# Patient Record
Sex: FEMALE | Race: WHITE | NOT HISPANIC OR LATINO | Employment: OTHER | URBAN - METROPOLITAN AREA
[De-identification: names, ages, dates, MRNs, and addresses within clinical notes are randomized per-mention and may not be internally consistent; named-entity substitution may affect disease eponyms.]

---

## 2018-06-22 ENCOUNTER — RECORDS - HEALTHEAST (OUTPATIENT)
Dept: LAB | Facility: CLINIC | Age: 68
End: 2018-06-22

## 2018-06-22 LAB
ANION GAP SERPL CALCULATED.3IONS-SCNC: 9 MMOL/L (ref 5–18)
BUN SERPL-MCNC: 15 MG/DL (ref 8–22)
CALCIUM SERPL-MCNC: 9.4 MG/DL (ref 8.5–10.5)
CHLORIDE BLD-SCNC: 104 MMOL/L (ref 98–107)
CHOLEST SERPL-MCNC: 195 MG/DL
CO2 SERPL-SCNC: 26 MMOL/L (ref 22–31)
CREAT SERPL-MCNC: 0.71 MG/DL (ref 0.6–1.1)
FASTING STATUS PATIENT QL REPORTED: YES
GFR SERPL CREATININE-BSD FRML MDRD: >60 ML/MIN/1.73M2
GLUCOSE BLD-MCNC: 104 MG/DL (ref 70–125)
HDLC SERPL-MCNC: 57 MG/DL
LDLC SERPL CALC-MCNC: 118 MG/DL
POTASSIUM BLD-SCNC: 4.3 MMOL/L (ref 3.5–5)
SODIUM SERPL-SCNC: 139 MMOL/L (ref 136–145)
TRIGL SERPL-MCNC: 102 MG/DL
TSH SERPL DL<=0.005 MIU/L-ACNC: 2.27 UIU/ML (ref 0.3–5)

## 2018-11-27 ENCOUNTER — RECORDS - HEALTHEAST (OUTPATIENT)
Dept: LAB | Facility: CLINIC | Age: 68
End: 2018-11-27

## 2018-11-27 LAB — BNP SERPL-MCNC: 100 PG/ML (ref 0–109)

## 2019-05-30 ENCOUNTER — RECORDS - HEALTHEAST (OUTPATIENT)
Dept: LAB | Facility: CLINIC | Age: 69
End: 2019-05-30

## 2019-05-30 LAB — TSH SERPL DL<=0.005 MIU/L-ACNC: 1.28 UIU/ML (ref 0.3–5)

## 2020-08-05 ENCOUNTER — RECORDS - HEALTHEAST (OUTPATIENT)
Dept: LAB | Facility: CLINIC | Age: 70
End: 2020-08-05

## 2020-08-05 LAB
ALBUMIN SERPL-MCNC: 3.7 G/DL (ref 3.5–5)
ALP SERPL-CCNC: 116 U/L (ref 45–120)
ALT SERPL W P-5'-P-CCNC: 15 U/L (ref 0–45)
ANION GAP SERPL CALCULATED.3IONS-SCNC: 10 MMOL/L (ref 5–18)
AST SERPL W P-5'-P-CCNC: 16 U/L (ref 0–40)
BILIRUB SERPL-MCNC: 1.1 MG/DL (ref 0–1)
BUN SERPL-MCNC: 13 MG/DL (ref 8–22)
CALCIUM SERPL-MCNC: 9.6 MG/DL (ref 8.5–10.5)
CHLORIDE BLD-SCNC: 104 MMOL/L (ref 98–107)
CHOLEST SERPL-MCNC: 187 MG/DL
CO2 SERPL-SCNC: 26 MMOL/L (ref 22–31)
CREAT SERPL-MCNC: 0.72 MG/DL (ref 0.6–1.1)
FASTING STATUS PATIENT QL REPORTED: NORMAL
GFR SERPL CREATININE-BSD FRML MDRD: >60 ML/MIN/1.73M2
GLUCOSE BLD-MCNC: 104 MG/DL (ref 70–125)
HDLC SERPL-MCNC: 60 MG/DL
LDLC SERPL CALC-MCNC: 112 MG/DL
POTASSIUM BLD-SCNC: 4.1 MMOL/L (ref 3.5–5)
PROT SERPL-MCNC: 7.8 G/DL (ref 6–8)
SODIUM SERPL-SCNC: 140 MMOL/L (ref 136–145)
TRIGL SERPL-MCNC: 75 MG/DL
TSH SERPL DL<=0.005 MIU/L-ACNC: 0.67 UIU/ML (ref 0.3–5)

## 2021-05-24 ENCOUNTER — RECORDS - HEALTHEAST (OUTPATIENT)
Dept: ADMINISTRATIVE | Facility: CLINIC | Age: 71
End: 2021-05-24

## 2021-06-02 ENCOUNTER — RECORDS - HEALTHEAST (OUTPATIENT)
Dept: ADMINISTRATIVE | Facility: CLINIC | Age: 71
End: 2021-06-02

## 2021-08-10 ENCOUNTER — LAB REQUISITION (OUTPATIENT)
Dept: LAB | Facility: CLINIC | Age: 71
End: 2021-08-10

## 2021-08-10 DIAGNOSIS — E03.9 HYPOTHYROIDISM, UNSPECIFIED: ICD-10-CM

## 2021-08-10 DIAGNOSIS — Z13.220 ENCOUNTER FOR SCREENING FOR LIPOID DISORDERS: ICD-10-CM

## 2021-08-10 LAB
ALBUMIN SERPL-MCNC: 3.3 G/DL (ref 3.5–5)
ALP SERPL-CCNC: 106 U/L (ref 45–120)
ALT SERPL W P-5'-P-CCNC: 15 U/L (ref 0–45)
ANION GAP SERPL CALCULATED.3IONS-SCNC: 10 MMOL/L (ref 5–18)
AST SERPL W P-5'-P-CCNC: 15 U/L (ref 0–40)
BILIRUB SERPL-MCNC: 0.9 MG/DL (ref 0–1)
BUN SERPL-MCNC: 12 MG/DL (ref 8–22)
CALCIUM SERPL-MCNC: 9.3 MG/DL (ref 8.5–10.5)
CHLORIDE BLD-SCNC: 103 MMOL/L (ref 98–107)
CHOLEST SERPL-MCNC: 194 MG/DL
CO2 SERPL-SCNC: 27 MMOL/L (ref 22–31)
CREAT SERPL-MCNC: 0.71 MG/DL (ref 0.6–1.1)
FASTING STATUS PATIENT QL REPORTED: NORMAL
GFR SERPL CREATININE-BSD FRML MDRD: 87 ML/MIN/1.73M2
GLUCOSE BLD-MCNC: 111 MG/DL (ref 70–125)
HDLC SERPL-MCNC: 57 MG/DL
LDLC SERPL CALC-MCNC: 119 MG/DL
POTASSIUM BLD-SCNC: 4.4 MMOL/L (ref 3.5–5)
PROT SERPL-MCNC: 7.4 G/DL (ref 6–8)
SODIUM SERPL-SCNC: 140 MMOL/L (ref 136–145)
TRIGL SERPL-MCNC: 88 MG/DL
TSH SERPL DL<=0.005 MIU/L-ACNC: 1.12 UIU/ML (ref 0.3–5)

## 2021-08-10 PROCEDURE — 84155 ASSAY OF PROTEIN SERUM: CPT | Performed by: PHYSICIAN ASSISTANT

## 2021-08-10 PROCEDURE — 84443 ASSAY THYROID STIM HORMONE: CPT | Performed by: PHYSICIAN ASSISTANT

## 2021-08-10 PROCEDURE — 82565 ASSAY OF CREATININE: CPT | Performed by: PHYSICIAN ASSISTANT

## 2021-08-10 PROCEDURE — 80061 LIPID PANEL: CPT | Performed by: PHYSICIAN ASSISTANT

## 2022-06-13 PROCEDURE — 82310 ASSAY OF CALCIUM: CPT | Performed by: PHYSICIAN ASSISTANT

## 2022-06-13 PROCEDURE — 84443 ASSAY THYROID STIM HORMONE: CPT | Performed by: PHYSICIAN ASSISTANT

## 2022-06-14 ENCOUNTER — LAB REQUISITION (OUTPATIENT)
Dept: LAB | Facility: CLINIC | Age: 72
End: 2022-06-14

## 2022-06-14 DIAGNOSIS — E03.9 HYPOTHYROIDISM, UNSPECIFIED: ICD-10-CM

## 2022-06-14 DIAGNOSIS — J44.9 CHRONIC OBSTRUCTIVE PULMONARY DISEASE, UNSPECIFIED (H): ICD-10-CM

## 2022-06-14 LAB
ANION GAP SERPL CALCULATED.3IONS-SCNC: 12 MMOL/L (ref 5–18)
BUN SERPL-MCNC: 16 MG/DL (ref 8–28)
CALCIUM SERPL-MCNC: 9.5 MG/DL (ref 8.5–10.5)
CHLORIDE BLD-SCNC: 101 MMOL/L (ref 98–107)
CO2 SERPL-SCNC: 26 MMOL/L (ref 22–31)
CREAT SERPL-MCNC: 0.68 MG/DL (ref 0.6–1.1)
GFR SERPL CREATININE-BSD FRML MDRD: >90 ML/MIN/1.73M2
GLUCOSE BLD-MCNC: 99 MG/DL (ref 70–125)
POTASSIUM BLD-SCNC: 4.2 MMOL/L (ref 3.5–5)
SODIUM SERPL-SCNC: 139 MMOL/L (ref 136–145)
TSH SERPL DL<=0.005 MIU/L-ACNC: 1.09 UIU/ML (ref 0.3–5)

## 2022-06-16 ENCOUNTER — LAB REQUISITION (OUTPATIENT)
Dept: LAB | Facility: CLINIC | Age: 72
End: 2022-06-16
Payer: COMMERCIAL

## 2022-06-16 DIAGNOSIS — Z01.812 ENCOUNTER FOR PREPROCEDURAL LABORATORY EXAMINATION: ICD-10-CM

## 2022-06-16 PROCEDURE — U0005 INFEC AGEN DETEC AMPLI PROBE: HCPCS | Mod: ORL | Performed by: PHYSICIAN ASSISTANT

## 2022-06-17 LAB — SARS-COV-2 RNA RESP QL NAA+PROBE: NEGATIVE

## 2023-01-01 ENCOUNTER — PATIENT OUTREACH (OUTPATIENT)
Dept: SURGERY | Facility: CLINIC | Age: 73
End: 2023-01-01
Payer: COMMERCIAL

## 2023-01-01 ENCOUNTER — HOSPITAL ENCOUNTER (OUTPATIENT)
Dept: RADIOLOGY | Facility: CLINIC | Age: 73
Discharge: HOME | End: 2023-09-15
Attending: RADIOLOGY
Payer: COMMERCIAL

## 2023-01-01 ENCOUNTER — OFFICE VISIT (OUTPATIENT)
Dept: SURGERY | Facility: CLINIC | Age: 73
End: 2023-01-01
Payer: COMMERCIAL

## 2023-01-01 ENCOUNTER — OFFICE VISIT (OUTPATIENT)
Dept: PULMONOLOGY | Facility: CLINIC | Age: 73
End: 2023-01-01
Payer: COMMERCIAL

## 2023-01-01 ENCOUNTER — APPOINTMENT (OUTPATIENT)
Dept: GENERAL RADIOLOGY | Facility: CLINIC | Age: 73
DRG: 208 | End: 2023-01-01
Attending: INTERNAL MEDICINE
Payer: COMMERCIAL

## 2023-01-01 ENCOUNTER — NURSE TRIAGE (OUTPATIENT)
Dept: NURSING | Facility: CLINIC | Age: 73
End: 2023-01-01

## 2023-01-01 ENCOUNTER — APPOINTMENT (OUTPATIENT)
Dept: GENERAL RADIOLOGY | Facility: CLINIC | Age: 73
DRG: 164 | End: 2023-01-01
Attending: THORACIC SURGERY (CARDIOTHORACIC VASCULAR SURGERY)
Payer: COMMERCIAL

## 2023-01-01 ENCOUNTER — HEALTH MAINTENANCE LETTER (OUTPATIENT)
Age: 73
End: 2023-01-01

## 2023-01-01 ENCOUNTER — APPOINTMENT (OUTPATIENT)
Dept: GENERAL RADIOLOGY | Facility: CLINIC | Age: 73
DRG: 208 | End: 2023-01-01
Attending: STUDENT IN AN ORGANIZED HEALTH CARE EDUCATION/TRAINING PROGRAM
Payer: COMMERCIAL

## 2023-01-01 ENCOUNTER — DOCUMENTATION ONLY (OUTPATIENT)
Dept: CARE COORDINATION | Facility: CLINIC | Age: 73
End: 2023-01-01

## 2023-01-01 ENCOUNTER — LAB (OUTPATIENT)
Dept: LAB | Facility: CLINIC | Age: 73
End: 2023-01-01
Payer: COMMERCIAL

## 2023-01-01 ENCOUNTER — NURSE TRIAGE (OUTPATIENT)
Dept: ONCOLOGY | Facility: CLINIC | Age: 73
End: 2023-01-01

## 2023-01-01 ENCOUNTER — TRANSFERRED RECORDS (OUTPATIENT)
Dept: HEALTH INFORMATION MANAGEMENT | Facility: CLINIC | Age: 73
End: 2023-01-01

## 2023-01-01 ENCOUNTER — HOSPITAL ENCOUNTER (OUTPATIENT)
Dept: CT IMAGING | Facility: CLINIC | Age: 73
Discharge: HOME | End: 2023-09-15
Attending: THORACIC SURGERY (CARDIOTHORACIC VASCULAR SURGERY)
Payer: COMMERCIAL

## 2023-01-01 ENCOUNTER — APPOINTMENT (OUTPATIENT)
Dept: OCCUPATIONAL THERAPY | Facility: CLINIC | Age: 73
DRG: 164 | End: 2023-01-01
Attending: THORACIC SURGERY (CARDIOTHORACIC VASCULAR SURGERY)
Payer: COMMERCIAL

## 2023-01-01 ENCOUNTER — APPOINTMENT (OUTPATIENT)
Dept: GENERAL RADIOLOGY | Facility: CLINIC | Age: 73
DRG: 164 | End: 2023-01-01
Attending: STUDENT IN AN ORGANIZED HEALTH CARE EDUCATION/TRAINING PROGRAM
Payer: COMMERCIAL

## 2023-01-01 ENCOUNTER — TUMOR CONFERENCE (OUTPATIENT)
Dept: ONCOLOGY | Facility: CLINIC | Age: 73
End: 2023-01-01
Payer: COMMERCIAL

## 2023-01-01 ENCOUNTER — ONCOLOGY VISIT (OUTPATIENT)
Dept: SURGERY | Facility: CLINIC | Age: 73
End: 2023-01-01
Attending: THORACIC SURGERY (CARDIOTHORACIC VASCULAR SURGERY)
Payer: COMMERCIAL

## 2023-01-01 ENCOUNTER — TELEPHONE (OUTPATIENT)
Dept: ALLERGY | Facility: CLINIC | Age: 73
End: 2023-01-01

## 2023-01-01 ENCOUNTER — TRANSFERRED RECORDS (OUTPATIENT)
Dept: PULMONOLOGY | Facility: CLINIC | Age: 73
End: 2023-01-01
Payer: COMMERCIAL

## 2023-01-01 ENCOUNTER — TELEPHONE (OUTPATIENT)
Dept: PULMONOLOGY | Facility: CLINIC | Age: 73
End: 2023-01-01
Payer: COMMERCIAL

## 2023-01-01 ENCOUNTER — APPOINTMENT (OUTPATIENT)
Dept: RADIOLOGY | Facility: CLINIC | Age: 73
End: 2023-01-01
Attending: EMERGENCY MEDICINE
Payer: COMMERCIAL

## 2023-01-01 ENCOUNTER — DOCUMENTATION ONLY (OUTPATIENT)
Dept: OTHER | Facility: CLINIC | Age: 73
End: 2023-01-01
Payer: COMMERCIAL

## 2023-01-01 ENCOUNTER — HOSPITAL ENCOUNTER (INPATIENT)
Facility: CLINIC | Age: 73
LOS: 2 days | Discharge: HOME | DRG: 164 | End: 2023-10-26
Attending: THORACIC SURGERY (CARDIOTHORACIC VASCULAR SURGERY) | Admitting: THORACIC SURGERY (CARDIOTHORACIC VASCULAR SURGERY)
Payer: COMMERCIAL

## 2023-01-01 ENCOUNTER — NURSE TRIAGE (OUTPATIENT)
Dept: ONCOLOGY | Facility: CLINIC | Age: 73
End: 2023-01-01
Payer: COMMERCIAL

## 2023-01-01 ENCOUNTER — DOCUMENTATION ONLY (OUTPATIENT)
Dept: CARE COORDINATION | Facility: CLINIC | Age: 73
End: 2023-01-01
Payer: COMMERCIAL

## 2023-01-01 ENCOUNTER — HOSPITAL ENCOUNTER (OUTPATIENT)
Dept: CT IMAGING | Facility: CLINIC | Age: 73
Discharge: HOME | End: 2023-09-17
Attending: THORACIC SURGERY (CARDIOTHORACIC VASCULAR SURGERY) | Admitting: THORACIC SURGERY (CARDIOTHORACIC VASCULAR SURGERY)
Payer: COMMERCIAL

## 2023-01-01 ENCOUNTER — ANCILLARY PROCEDURE (OUTPATIENT)
Dept: GENERAL RADIOLOGY | Facility: CLINIC | Age: 73
End: 2023-01-01
Attending: THORACIC SURGERY (CARDIOTHORACIC VASCULAR SURGERY)
Payer: COMMERCIAL

## 2023-01-01 ENCOUNTER — TELEPHONE (OUTPATIENT)
Dept: SURGERY | Facility: CLINIC | Age: 73
End: 2023-01-01

## 2023-01-01 ENCOUNTER — APPOINTMENT (OUTPATIENT)
Dept: CT IMAGING | Facility: CLINIC | Age: 73
DRG: 208 | End: 2023-01-01
Attending: THORACIC SURGERY (CARDIOTHORACIC VASCULAR SURGERY)
Payer: COMMERCIAL

## 2023-01-01 ENCOUNTER — HOSPITAL ENCOUNTER (OUTPATIENT)
Facility: HOSPITAL | Age: 73
Discharge: HOME | End: 2023-09-28
Attending: INTERNAL MEDICINE | Admitting: INTERNAL MEDICINE
Payer: COMMERCIAL

## 2023-01-01 ENCOUNTER — TELEPHONE (OUTPATIENT)
Dept: INTERVENTIONAL RADIOLOGY/VASCULAR | Facility: CLINIC | Age: 73
End: 2023-01-01

## 2023-01-01 ENCOUNTER — ANESTHESIA EVENT (OUTPATIENT)
Dept: SURGERY | Facility: CLINIC | Age: 73
DRG: 164 | End: 2023-01-01
Payer: COMMERCIAL

## 2023-01-01 ENCOUNTER — PRE VISIT (OUTPATIENT)
Dept: SURGERY | Facility: CLINIC | Age: 73
End: 2023-01-01

## 2023-01-01 ENCOUNTER — OFFICE VISIT (OUTPATIENT)
Dept: PULMONOLOGY | Facility: CLINIC | Age: 73
End: 2023-01-01
Attending: THORACIC SURGERY (CARDIOTHORACIC VASCULAR SURGERY)
Payer: COMMERCIAL

## 2023-01-01 ENCOUNTER — LAB REQUISITION (OUTPATIENT)
Dept: LAB | Facility: CLINIC | Age: 73
End: 2023-01-01

## 2023-01-01 ENCOUNTER — HOSPITAL ENCOUNTER (EMERGENCY)
Facility: CLINIC | Age: 73
Discharge: HOME | End: 2023-09-27
Attending: EMERGENCY MEDICINE | Admitting: EMERGENCY MEDICINE
Payer: COMMERCIAL

## 2023-01-01 ENCOUNTER — ANESTHESIA (OUTPATIENT)
Dept: SURGERY | Facility: HOSPITAL | Age: 73
End: 2023-01-01
Payer: COMMERCIAL

## 2023-01-01 ENCOUNTER — PREP FOR PROCEDURE (OUTPATIENT)
Dept: SURGERY | Facility: CLINIC | Age: 73
End: 2023-01-01
Payer: COMMERCIAL

## 2023-01-01 ENCOUNTER — ANESTHESIA (OUTPATIENT)
Dept: SURGERY | Facility: CLINIC | Age: 73
DRG: 164 | End: 2023-01-01
Payer: COMMERCIAL

## 2023-01-01 ENCOUNTER — TELEPHONE (OUTPATIENT)
Dept: SURGERY | Facility: CLINIC | Age: 73
End: 2023-01-01
Payer: COMMERCIAL

## 2023-01-01 ENCOUNTER — HOSPITAL ENCOUNTER (EMERGENCY)
Facility: CLINIC | Age: 73
Discharge: HOME | End: 2023-11-04
Attending: EMERGENCY MEDICINE
Payer: COMMERCIAL

## 2023-01-01 ENCOUNTER — APPOINTMENT (OUTPATIENT)
Dept: CT IMAGING | Facility: CLINIC | Age: 73
End: 2023-01-01
Payer: COMMERCIAL

## 2023-01-01 ENCOUNTER — PATIENT OUTREACH (OUTPATIENT)
Dept: SURGERY | Facility: CLINIC | Age: 73
End: 2023-01-01

## 2023-01-01 ENCOUNTER — HOSPITAL ENCOUNTER (INPATIENT)
Facility: CLINIC | Age: 73
LOS: 1 days | DRG: 208 | End: 2023-11-19
Attending: THORACIC SURGERY (CARDIOTHORACIC VASCULAR SURGERY) | Admitting: THORACIC SURGERY (CARDIOTHORACIC VASCULAR SURGERY)
Payer: COMMERCIAL

## 2023-01-01 ENCOUNTER — HOSPITAL ENCOUNTER (EMERGENCY)
Facility: CLINIC | Age: 73
Discharge: HOME | End: 2023-10-01
Payer: COMMERCIAL

## 2023-01-01 ENCOUNTER — PREP FOR PROCEDURE (OUTPATIENT)
Dept: PULMONOLOGY | Facility: CLINIC | Age: 73
End: 2023-01-01
Payer: COMMERCIAL

## 2023-01-01 ENCOUNTER — ANESTHESIA EVENT (OUTPATIENT)
Dept: SURGERY | Facility: HOSPITAL | Age: 73
End: 2023-01-01
Payer: COMMERCIAL

## 2023-01-01 VITALS
SYSTOLIC BLOOD PRESSURE: 145 MMHG | DIASTOLIC BLOOD PRESSURE: 93 MMHG | BODY MASS INDEX: 40.71 KG/M2 | HEART RATE: 69 BPM | OXYGEN SATURATION: 96 % | WEIGHT: 222.6 LBS

## 2023-01-01 VITALS
WEIGHT: 216 LBS | RESPIRATION RATE: 18 BRPM | TEMPERATURE: 97.4 F | HEART RATE: 62 BPM | SYSTOLIC BLOOD PRESSURE: 173 MMHG | DIASTOLIC BLOOD PRESSURE: 82 MMHG | BODY MASS INDEX: 39.51 KG/M2 | OXYGEN SATURATION: 93 %

## 2023-01-01 VITALS
HEART RATE: 47 BPM | HEIGHT: 62 IN | OXYGEN SATURATION: 96 % | SYSTOLIC BLOOD PRESSURE: 148 MMHG | WEIGHT: 226 LBS | DIASTOLIC BLOOD PRESSURE: 69 MMHG | RESPIRATION RATE: 16 BRPM | TEMPERATURE: 97.3 F | BODY MASS INDEX: 41.59 KG/M2

## 2023-01-01 VITALS
SYSTOLIC BLOOD PRESSURE: 149 MMHG | OXYGEN SATURATION: 97 % | TEMPERATURE: 97.7 F | HEIGHT: 62 IN | RESPIRATION RATE: 16 BRPM | WEIGHT: 227 LBS | BODY MASS INDEX: 41.77 KG/M2 | HEART RATE: 51 BPM | DIASTOLIC BLOOD PRESSURE: 87 MMHG

## 2023-01-01 VITALS
HEIGHT: 62 IN | BODY MASS INDEX: 41.74 KG/M2 | SYSTOLIC BLOOD PRESSURE: 139 MMHG | RESPIRATION RATE: 18 BRPM | DIASTOLIC BLOOD PRESSURE: 66 MMHG | HEART RATE: 58 BPM | OXYGEN SATURATION: 96 %

## 2023-01-01 VITALS
BODY MASS INDEX: 41.34 KG/M2 | DIASTOLIC BLOOD PRESSURE: 84 MMHG | TEMPERATURE: 97.9 F | HEART RATE: 59 BPM | SYSTOLIC BLOOD PRESSURE: 191 MMHG | WEIGHT: 226 LBS | RESPIRATION RATE: 20 BRPM | OXYGEN SATURATION: 98 %

## 2023-01-01 VITALS
HEART RATE: 64 BPM | SYSTOLIC BLOOD PRESSURE: 161 MMHG | BODY MASS INDEX: 40.42 KG/M2 | DIASTOLIC BLOOD PRESSURE: 90 MMHG | WEIGHT: 228.2 LBS | OXYGEN SATURATION: 95 %

## 2023-01-01 VITALS
HEART RATE: 68 BPM | OXYGEN SATURATION: 94 % | BODY MASS INDEX: 40.98 KG/M2 | DIASTOLIC BLOOD PRESSURE: 68 MMHG | WEIGHT: 222.7 LBS | RESPIRATION RATE: 16 BRPM | SYSTOLIC BLOOD PRESSURE: 118 MMHG | TEMPERATURE: 97.9 F | HEIGHT: 62 IN

## 2023-01-01 VITALS
HEART RATE: 61 BPM | WEIGHT: 221 LBS | RESPIRATION RATE: 16 BRPM | BODY MASS INDEX: 40.67 KG/M2 | OXYGEN SATURATION: 96 % | DIASTOLIC BLOOD PRESSURE: 85 MMHG | TEMPERATURE: 97.8 F | HEIGHT: 62 IN | SYSTOLIC BLOOD PRESSURE: 183 MMHG

## 2023-01-01 VITALS
SYSTOLIC BLOOD PRESSURE: 157 MMHG | HEART RATE: 60 BPM | RESPIRATION RATE: 15 BRPM | DIASTOLIC BLOOD PRESSURE: 78 MMHG | BODY MASS INDEX: 41.34 KG/M2 | WEIGHT: 226 LBS | TEMPERATURE: 96.7 F | OXYGEN SATURATION: 96 %

## 2023-01-01 VITALS
TEMPERATURE: 100.1 F | DIASTOLIC BLOOD PRESSURE: 62 MMHG | OXYGEN SATURATION: 29 % | BODY MASS INDEX: 41.84 KG/M2 | SYSTOLIC BLOOD PRESSURE: 128 MMHG | WEIGHT: 228.84 LBS

## 2023-01-01 DIAGNOSIS — C34.31 MALIGNANT NEOPLASM OF LOWER LOBE OF RIGHT LUNG (H): Primary | ICD-10-CM

## 2023-01-01 DIAGNOSIS — E66.01 MORBID (SEVERE) OBESITY DUE TO EXCESS CALORIES (H): ICD-10-CM

## 2023-01-01 DIAGNOSIS — R91.8 RIGHT LOWER LOBE LUNG MASS: Primary | ICD-10-CM

## 2023-01-01 DIAGNOSIS — C34.31 MALIGNANT NEOPLASM OF LOWER LOBE OF RIGHT LUNG (H): ICD-10-CM

## 2023-01-01 DIAGNOSIS — Z01.818 PREOP EXAMINATION: Primary | ICD-10-CM

## 2023-01-01 DIAGNOSIS — R10.9 RIGHT FLANK PAIN: ICD-10-CM

## 2023-01-01 DIAGNOSIS — R91.8 RIGHT LOWER LOBE LUNG MASS: ICD-10-CM

## 2023-01-01 DIAGNOSIS — E03.9 HYPOTHYROIDISM, UNSPECIFIED: ICD-10-CM

## 2023-01-01 DIAGNOSIS — J94.2 HEMOPNEUMOTHORAX ON RIGHT: ICD-10-CM

## 2023-01-01 DIAGNOSIS — D02.20 NON-MUCINOUS ADENOCARCINOMA IN SITU OF LUNG: Primary | ICD-10-CM

## 2023-01-01 DIAGNOSIS — Z01.818 PREOP EXAMINATION: ICD-10-CM

## 2023-01-01 DIAGNOSIS — I10 HYPERTENSION: ICD-10-CM

## 2023-01-01 DIAGNOSIS — R91.1 PULMONARY NODULE: ICD-10-CM

## 2023-01-01 DIAGNOSIS — R91.8 PULMONARY NODULES: Primary | ICD-10-CM

## 2023-01-01 DIAGNOSIS — Z90.2 S/P LOBECTOMY OF LUNG: ICD-10-CM

## 2023-01-01 LAB
ABO/RH(D): NORMAL
ALBUMIN SERPL BCG-MCNC: 2.6 G/DL (ref 3.5–5.2)
ALBUMIN SERPL BCG-MCNC: 4 G/DL (ref 3.5–5.2)
ALBUMIN SERPL BCG-MCNC: 4.1 G/DL (ref 3.5–5.2)
ALBUMIN UR-MCNC: 30 MG/DL
ALBUMIN UR-MCNC: NEGATIVE MG/DL
ALP SERPL-CCNC: 102 U/L (ref 35–104)
ALP SERPL-CCNC: 111 U/L (ref 35–104)
ALP SERPL-CCNC: 99 U/L (ref 40–150)
ALT SERPL W P-5'-P-CCNC: 12 U/L (ref 10–35)
ALT SERPL W P-5'-P-CCNC: 24 U/L (ref 0–50)
ALT SERPL W P-5'-P-CCNC: 8 U/L (ref 0–50)
ANION GAP SERPL CALCULATED.3IONS-SCNC: 10 MMOL/L (ref 7–15)
ANION GAP SERPL CALCULATED.3IONS-SCNC: 12 MMOL/L (ref 7–15)
ANION GAP SERPL CALCULATED.3IONS-SCNC: 13 MMOL/L (ref 7–15)
ANION GAP SERPL CALCULATED.3IONS-SCNC: 13 MMOL/L (ref 7–15)
ANION GAP SERPL CALCULATED.3IONS-SCNC: 8 MMOL/L (ref 7–15)
ANION GAP SERPL CALCULATED.3IONS-SCNC: 9 MMOL/L (ref 7–15)
ANTIBODY SCREEN: NEGATIVE
APPEARANCE UR: ABNORMAL
APPEARANCE UR: CLEAR
APTT PPP: 25 SECONDS (ref 22–38)
AST SERPL W P-5'-P-CCNC: 15 U/L (ref 10–35)
AST SERPL W P-5'-P-CCNC: 17 U/L (ref 0–45)
AST SERPL W P-5'-P-CCNC: 71 U/L (ref 0–45)
BACTERIA #/AREA URNS HPF: ABNORMAL /HPF
BACTERIA UR CULT: NORMAL
BASE EXCESS BLDA CALC-SCNC: -0.9 MMOL/L (ref -9.6–2)
BASE EXCESS BLDA CALC-SCNC: -2.8 MMOL/L (ref -9.6–2)
BASE EXCESS BLDA CALC-SCNC: 2.3 MMOL/L (ref -9.6–2)
BASE EXCESS BLDV CALC-SCNC: 3.8 MMOL/L (ref -7.7–1.9)
BASOPHILS # BLD AUTO: 0 10E3/UL (ref 0–0.2)
BASOPHILS NFR BLD AUTO: 0 %
BILIRUB DIRECT SERPL-MCNC: 0.21 MG/DL (ref 0–0.3)
BILIRUB SERPL-MCNC: 0.7 MG/DL
BILIRUB SERPL-MCNC: 0.9 MG/DL
BILIRUB SERPL-MCNC: 0.9 MG/DL
BILIRUB UR QL STRIP: NEGATIVE
BILIRUB UR QL STRIP: NEGATIVE
BLD PROD TYP BPU: NORMAL
BLOOD COMPONENT TYPE: NORMAL
BUN SERPL-MCNC: 10.1 MG/DL (ref 8–23)
BUN SERPL-MCNC: 10.5 MG/DL (ref 8–23)
BUN SERPL-MCNC: 11.4 MG/DL (ref 8–23)
BUN SERPL-MCNC: 14.3 MG/DL (ref 8–23)
BUN SERPL-MCNC: 27.5 MG/DL (ref 8–23)
BUN SERPL-MCNC: 9.6 MG/DL (ref 8–23)
CA-I BLD-MCNC: 4.6 MG/DL (ref 4.4–5.2)
CA-I BLD-MCNC: 4.6 MG/DL (ref 4.4–5.2)
CA-I BLD-MCNC: 4.7 MG/DL (ref 4.4–5.2)
CA-I BLD-MCNC: 4.9 MG/DL (ref 4.4–5.2)
CALCIUM SERPL-MCNC: 8.7 MG/DL (ref 8.8–10.2)
CALCIUM SERPL-MCNC: 8.8 MG/DL (ref 8.8–10.2)
CALCIUM SERPL-MCNC: 9 MG/DL (ref 8.8–10.2)
CALCIUM SERPL-MCNC: 9.3 MG/DL (ref 8.8–10.2)
CALCIUM SERPL-MCNC: 9.4 MG/DL (ref 8.8–10.2)
CALCIUM SERPL-MCNC: 9.5 MG/DL (ref 8.8–10.2)
CHLORIDE SERPL-SCNC: 101 MMOL/L (ref 98–107)
CHLORIDE SERPL-SCNC: 101 MMOL/L (ref 98–107)
CHLORIDE SERPL-SCNC: 102 MMOL/L (ref 98–107)
CHLORIDE SERPL-SCNC: 104 MMOL/L (ref 98–107)
CHLORIDE SERPL-SCNC: 107 MMOL/L (ref 98–107)
CHLORIDE SERPL-SCNC: 109 MMOL/L (ref 98–107)
CHOLEST SERPL-MCNC: 197 MG/DL
CODING SYSTEM: NORMAL
COLOR UR AUTO: ABNORMAL
COLOR UR AUTO: YELLOW
CREAT BLD-MCNC: 0.6 MG/DL (ref 0.6–1.1)
CREAT SERPL-MCNC: 0.58 MG/DL (ref 0.51–0.95)
CREAT SERPL-MCNC: 0.6 MG/DL (ref 0.51–0.95)
CREAT SERPL-MCNC: 0.62 MG/DL (ref 0.51–0.95)
CREAT SERPL-MCNC: 0.64 MG/DL (ref 0.51–0.95)
CREAT SERPL-MCNC: 0.66 MG/DL (ref 0.51–0.95)
CREAT SERPL-MCNC: 0.66 MG/DL (ref 0.51–0.95)
CREAT SERPL-MCNC: 0.68 MG/DL (ref 0.51–0.95)
CROSSMATCH: NORMAL
DEPRECATED HCO3 PLAS-SCNC: 24 MMOL/L (ref 22–29)
DEPRECATED HCO3 PLAS-SCNC: 24 MMOL/L (ref 22–29)
DEPRECATED HCO3 PLAS-SCNC: 26 MMOL/L (ref 22–29)
DEPRECATED HCO3 PLAS-SCNC: 27 MMOL/L (ref 22–29)
DEPRECATED HCO3 PLAS-SCNC: 28 MMOL/L (ref 22–29)
DEPRECATED HCO3 PLAS-SCNC: 29 MMOL/L (ref 22–29)
DLCOCOR-%PRED-PRE: 109 %
DLCOCOR-PRE: 19.56 ML/MIN/MMHG
DLCOUNC-%PRED-PRE: 110 %
DLCOUNC-PRE: 19.8 ML/MIN/MMHG
DLCOUNC-PRED: 17.85 ML/MIN/MMHG
EGFRCR SERPLBLD CKD-EPI 2021: >60 ML/MIN/1.73M2
EGFRCR SERPLBLD CKD-EPI 2021: >90 ML/MIN/1.73M2
EOSINOPHIL # BLD AUTO: 0.2 10E3/UL (ref 0–0.7)
EOSINOPHIL NFR BLD AUTO: 3 %
ERV-%PRED-PRE: 21 %
ERV-PRE: 0.14 L
ERV-PRED: 0.64 L
ERYTHROCYTE [DISTWIDTH] IN BLOOD BY AUTOMATED COUNT: 13.5 % (ref 10–15)
ERYTHROCYTE [DISTWIDTH] IN BLOOD BY AUTOMATED COUNT: 13.6 % (ref 10–15)
ERYTHROCYTE [DISTWIDTH] IN BLOOD BY AUTOMATED COUNT: 13.8 % (ref 10–15)
ERYTHROCYTE [DISTWIDTH] IN BLOOD BY AUTOMATED COUNT: 13.8 % (ref 10–15)
ERYTHROCYTE [DISTWIDTH] IN BLOOD BY AUTOMATED COUNT: 14.6 % (ref 10–15)
EXPTIME-PRE: 7.97 SEC
FEF2575-%PRED-POST: 57 %
FEF2575-%PRED-PRE: 52 %
FEF2575-POST: 0.95 L/SEC
FEF2575-PRE: 0.87 L/SEC
FEF2575-PRED: 1.66 L/SEC
FEFMAX-%PRED-PRE: 88 %
FEFMAX-PRE: 4.61 L/SEC
FEFMAX-PRED: 5.23 L/SEC
FEV1-%PRED-PRE: 81 %
FEV1-PRE: 1.54 L
FEV1FEV6-PRE: 65 %
FEV1FEV6-PRED: 79 %
FEV1FVC-PRE: 65 %
FEV1FVC-PRED: 79 %
FEV1SVC-PRE: 62 %
FEV1SVC-PRED: 69 %
FIBRINOGEN PPP-MCNC: 716 MG/DL (ref 170–490)
FIFMAX-PRE: 3.99 L/SEC
FRCPLETH-%PRED-PRE: 118 %
FRCPLETH-PRE: 3 L
FRCPLETH-PRED: 2.54 L
FVC-%PRED-PRE: 98 %
FVC-PRE: 2.36 L
FVC-PRED: 2.39 L
GFR SERPL CREATININE-BSD FRML MDRD: >90 ML/MIN/1.73M2
GLUCOSE BLD-MCNC: 135 MG/DL (ref 70–99)
GLUCOSE BLD-MCNC: 153 MG/DL (ref 70–99)
GLUCOSE BLD-MCNC: 168 MG/DL (ref 70–99)
GLUCOSE BLDC GLUCOMTR-MCNC: 114 MG/DL (ref 70–99)
GLUCOSE BLDC GLUCOMTR-MCNC: 125 MG/DL (ref 70–99)
GLUCOSE BLDC GLUCOMTR-MCNC: 139 MG/DL (ref 70–99)
GLUCOSE BLDC GLUCOMTR-MCNC: 176 MG/DL (ref 70–99)
GLUCOSE SERPL-MCNC: 103 MG/DL (ref 70–99)
GLUCOSE SERPL-MCNC: 106 MG/DL (ref 70–99)
GLUCOSE SERPL-MCNC: 112 MG/DL (ref 70–99)
GLUCOSE SERPL-MCNC: 127 MG/DL (ref 70–99)
GLUCOSE SERPL-MCNC: 170 MG/DL (ref 70–99)
GLUCOSE SERPL-MCNC: 187 MG/DL (ref 70–99)
GLUCOSE UR STRIP-MCNC: NEGATIVE MG/DL
GLUCOSE UR STRIP-MCNC: NEGATIVE MG/DL
HCO3 BLDA-SCNC: 23 MMOL/L (ref 21–28)
HCO3 BLDA-SCNC: 25 MMOL/L (ref 21–28)
HCO3 BLDA-SCNC: 27 MMOL/L (ref 21–28)
HCO3 BLDV-SCNC: 29 MMOL/L (ref 21–28)
HCT VFR BLD AUTO: 37 % (ref 35–47)
HCT VFR BLD AUTO: 39.6 % (ref 35–47)
HCT VFR BLD AUTO: 40.3 % (ref 35–47)
HCT VFR BLD AUTO: 41.8 % (ref 35–47)
HCT VFR BLD AUTO: 44 % (ref 35–47)
HDLC SERPL-MCNC: 61 MG/DL
HGB BLD-MCNC: 11.6 G/DL (ref 11.7–15.7)
HGB BLD-MCNC: 13 G/DL (ref 11.7–15.7)
HGB BLD-MCNC: 13.1 G/DL (ref 11.7–15.7)
HGB BLD-MCNC: 13.8 G/DL
HGB BLD-MCNC: 14.2 G/DL (ref 11.7–15.7)
HGB BLD-MCNC: 14.3 G/DL (ref 11.7–15.7)
HGB BLD-MCNC: 14.4 G/DL (ref 11.7–15.7)
HGB BLD-MCNC: 14.6 G/DL (ref 11.7–15.7)
HGB BLD-MCNC: 14.8 G/DL (ref 11.7–15.7)
HGB BLD-MCNC: 15.6 G/DL (ref 11.7–15.7)
HGB UR QL STRIP: ABNORMAL
HGB UR QL STRIP: NEGATIVE
IC-%PRED-PRE: 111 %
IC-PRE: 2.27 L
IC-PRED: 2.04 L
IMM GRANULOCYTES # BLD: 0 10E3/UL
IMM GRANULOCYTES NFR BLD: 0 %
INR PPP: 0.98 (ref 0.85–1.15)
INR PPP: 1.18 (ref 0.85–1.15)
INTERPRETATION: NORMAL
ISSUE DATE AND TIME: NORMAL
ISSUE DATE AND TIME: NORMAL
KETONES UR STRIP-MCNC: NEGATIVE MG/DL
KETONES UR STRIP-MCNC: NEGATIVE MG/DL
LAB DIRECTOR COMMENTS: NORMAL
LAB DIRECTOR DISCLAIMER: NORMAL
LAB DIRECTOR INTERPRETATION: NORMAL
LAB DIRECTOR METHODOLOGY: NORMAL
LAB DIRECTOR RESULTS: NORMAL
LACTATE BLD-SCNC: 0.5 MMOL/L
LACTATE BLD-SCNC: 0.8 MMOL/L
LACTATE BLD-SCNC: 0.8 MMOL/L
LACTATE SERPL-SCNC: 1.7 MMOL/L (ref 0.7–2)
LDLC SERPL CALC-MCNC: 117 MG/DL
LEUKOCYTE ESTERASE UR QL STRIP: ABNORMAL
LEUKOCYTE ESTERASE UR QL STRIP: NEGATIVE
LIPASE SERPL-CCNC: 15 U/L (ref 13–60)
LYMPHOCYTES # BLD AUTO: 2.1 10E3/UL (ref 0.8–5.3)
LYMPHOCYTES NFR BLD AUTO: 29 %
MAGNESIUM SERPL-MCNC: 1.8 MG/DL (ref 1.7–2.3)
MAGNESIUM SERPL-MCNC: 1.9 MG/DL (ref 1.7–2.3)
MAGNESIUM SERPL-MCNC: 1.9 MG/DL (ref 1.7–2.3)
MCH RBC QN AUTO: 29.7 PG (ref 26.5–33)
MCH RBC QN AUTO: 30.2 PG (ref 26.5–33)
MCH RBC QN AUTO: 30.3 PG (ref 26.5–33)
MCH RBC QN AUTO: 30.4 PG (ref 26.5–33)
MCH RBC QN AUTO: 31 PG (ref 26.5–33)
MCHC RBC AUTO-ENTMCNC: 31.4 G/DL (ref 31.5–36.5)
MCHC RBC AUTO-ENTMCNC: 32.5 G/DL (ref 31.5–36.5)
MCHC RBC AUTO-ENTMCNC: 32.7 G/DL (ref 31.5–36.5)
MCHC RBC AUTO-ENTMCNC: 32.8 G/DL (ref 31.5–36.5)
MCHC RBC AUTO-ENTMCNC: 34.2 G/DL (ref 31.5–36.5)
MCV RBC AUTO: 89 FL (ref 78–100)
MCV RBC AUTO: 91 FL (ref 78–100)
MCV RBC AUTO: 92 FL (ref 78–100)
MCV RBC AUTO: 96 FL (ref 78–100)
MCV RBC AUTO: 96 FL (ref 78–100)
MONOCYTES # BLD AUTO: 0.6 10E3/UL (ref 0–1.3)
MONOCYTES NFR BLD AUTO: 9 %
MUCOUS THREADS #/AREA URNS LPF: PRESENT /LPF
MUCOUS THREADS #/AREA URNS LPF: PRESENT /LPF
NEUTROPHILS # BLD AUTO: 4.2 10E3/UL (ref 1.6–8.3)
NEUTROPHILS NFR BLD AUTO: 59 %
NITRATE UR QL: NEGATIVE
NITRATE UR QL: NEGATIVE
NONHDLC SERPL-MCNC: 136 MG/DL
NRBC # BLD AUTO: 0 10E3/UL
NRBC BLD AUTO-RTO: 0 /100
O2/TOTAL GAS SETTING VFR VENT: 100 %
O2/TOTAL GAS SETTING VFR VENT: 40 %
OXYHGB MFR BLDV: 88 % (ref 70–75)
PATH REPORT.COMMENTS IMP SPEC: ABNORMAL
PATH REPORT.COMMENTS IMP SPEC: NORMAL
PATH REPORT.COMMENTS IMP SPEC: YES
PATH REPORT.FINAL DX SPEC: ABNORMAL
PATH REPORT.FINAL DX SPEC: NORMAL
PATH REPORT.FINAL DX SPEC: NORMAL
PATH REPORT.GROSS SPEC: ABNORMAL
PATH REPORT.GROSS SPEC: NORMAL
PATH REPORT.GROSS SPEC: NORMAL
PATH REPORT.INTRAOP OBS SPEC DOC: NORMAL
PATH REPORT.MICROSCOPIC SPEC OTHER STN: ABNORMAL
PATH REPORT.MICROSCOPIC SPEC OTHER STN: NORMAL
PATH REPORT.MICROSCOPIC SPEC OTHER STN: NORMAL
PATH REPORT.RELEVANT HX SPEC: ABNORMAL
PATH REPORT.RELEVANT HX SPEC: NORMAL
PCO2 BLDA: 40 MM HG (ref 35–45)
PCO2 BLDA: 43 MM HG (ref 35–45)
PCO2 BLDA: 44 MM HG (ref 35–45)
PCO2 BLDV: 44 MM HG (ref 40–50)
PH BLDA: 7.34 [PH] (ref 7.35–7.45)
PH BLDA: 7.36 [PH] (ref 7.35–7.45)
PH BLDA: 7.44 [PH] (ref 7.35–7.45)
PH BLDV: 7.43 [PH] (ref 7.32–7.43)
PH UR STRIP: 5.5 [PH] (ref 5–7)
PH UR STRIP: 6.5 [PH] (ref 5–7)
PHOSPHATE SERPL-MCNC: 2.1 MG/DL (ref 2.5–4.5)
PHOSPHATE SERPL-MCNC: 2.4 MG/DL (ref 2.5–4.5)
PHOSPHATE SERPL-MCNC: 3 MG/DL (ref 2.5–4.5)
PHOTO IMAGE: ABNORMAL
PHOTO IMAGE: NORMAL
PLATELET # BLD AUTO: 192 10E3/UL (ref 150–450)
PLATELET # BLD AUTO: 198 10E3/UL (ref 150–450)
PLATELET # BLD AUTO: 206 10E3/UL (ref 150–450)
PLATELET # BLD AUTO: 210 10E3/UL (ref 150–450)
PLATELET # BLD AUTO: 221 10E3/UL (ref 150–450)
PLATELET # BLD AUTO: 250 10E3/UL (ref 150–450)
PO2 BLDA: 108 MM HG (ref 80–105)
PO2 BLDA: 195 MM HG (ref 80–105)
PO2 BLDA: 453 MM HG (ref 80–105)
PO2 BLDV: 57 MM HG (ref 25–47)
POTASSIUM BLD-SCNC: 3.7 MMOL/L (ref 3.5–5)
POTASSIUM BLD-SCNC: 3.8 MMOL/L (ref 3.5–5)
POTASSIUM BLD-SCNC: 3.9 MMOL/L (ref 3.5–5)
POTASSIUM SERPL-SCNC: 3.4 MMOL/L (ref 3.4–5.3)
POTASSIUM SERPL-SCNC: 3.8 MMOL/L (ref 3.4–5.3)
POTASSIUM SERPL-SCNC: 3.9 MMOL/L (ref 3.4–5.3)
POTASSIUM SERPL-SCNC: 3.9 MMOL/L (ref 3.4–5.3)
POTASSIUM SERPL-SCNC: 4 MMOL/L (ref 3.4–5.3)
POTASSIUM SERPL-SCNC: 4.2 MMOL/L (ref 3.4–5.3)
POTASSIUM SERPL-SCNC: 4.3 MMOL/L (ref 3.4–5.3)
PROCALCITONIN SERPL IA-MCNC: 1.16 NG/ML
PROT SERPL-MCNC: 6.3 G/DL (ref 6.4–8.3)
PROT SERPL-MCNC: 7.5 G/DL (ref 6.4–8.3)
PROT SERPL-MCNC: 7.8 G/DL (ref 6.4–8.3)
RBC # BLD AUTO: 3.84 10E6/UL (ref 3.8–5.2)
RBC # BLD AUTO: 4.22 10E6/UL (ref 3.8–5.2)
RBC # BLD AUTO: 4.29 10E6/UL (ref 3.8–5.2)
RBC # BLD AUTO: 4.71 10E6/UL (ref 3.8–5.2)
RBC # BLD AUTO: 4.85 10E6/UL (ref 3.8–5.2)
RBC URINE: 53 /HPF
RBC URINE: <1 /HPF
RVPLETH-%PRED-PRE: 153 %
RVPLETH-PRE: 2.8 L
RVPLETH-PRED: 1.82 L
SIGNIFICANT RESULTS: NORMAL
SODIUM BLD-SCNC: 140 MMOL/L (ref 135–145)
SODIUM BLD-SCNC: 142 MMOL/L (ref 135–145)
SODIUM BLD-SCNC: 143 MMOL/L (ref 135–145)
SODIUM SERPL-SCNC: 137 MMOL/L (ref 135–145)
SODIUM SERPL-SCNC: 138 MMOL/L (ref 135–145)
SODIUM SERPL-SCNC: 139 MMOL/L (ref 135–145)
SODIUM SERPL-SCNC: 142 MMOL/L (ref 135–145)
SODIUM SERPL-SCNC: 145 MMOL/L (ref 136–145)
SODIUM SERPL-SCNC: 146 MMOL/L (ref 135–145)
SP GR UR STRIP: 1.01 (ref 1–1.03)
SP GR UR STRIP: 1.01 (ref 1–1.03)
SPECIMEN DESCRIPTION: NORMAL
SPECIMEN DESCRIPTION: NORMAL
SPECIMEN EXPIRATION DATE: NORMAL
SQUAMOUS EPITHELIAL: 3 /HPF
SQUAMOUS EPITHELIAL: <1 /HPF
TEST DETAILS, MDL: NORMAL
TLCPLETH-%PRED-PRE: 114 %
TLCPLETH-PRE: 5.27 L
TLCPLETH-PRED: 4.59 L
TRANSITIONAL EPI: <1 /HPF
TRIGL SERPL-MCNC: 94 MG/DL
TROPONIN T SERPL HS-MCNC: 42 NG/L
TSH SERPL DL<=0.005 MIU/L-ACNC: 0.89 UIU/ML (ref 0.3–4.2)
UNIT ABO/RH: NORMAL
UNIT NUMBER: NORMAL
UNIT STATUS: NORMAL
UNIT TYPE ISBT: 5100
UROBILINOGEN UR STRIP-MCNC: 4 MG/DL
UROBILINOGEN UR STRIP-MCNC: <2 MG/DL
VA-%PRED-PRE: 110 %
VA-PRE: 4.65 L
VC-%PRED-PRE: 89 %
VC-PRE: 2.47 L
VC-PRED: 2.75 L
WBC # BLD AUTO: 10.6 10E3/UL (ref 4–11)
WBC # BLD AUTO: 13.2 10E3/UL (ref 4–11)
WBC # BLD AUTO: 13.8 10E3/UL (ref 4–11)
WBC # BLD AUTO: 15.2 10E3/UL (ref 4–11)
WBC # BLD AUTO: 7.2 10E3/UL (ref 4–11)
WBC URINE: 3 /HPF
WBC URINE: 4 /HPF

## 2023-01-01 PROCEDURE — 88305 TISSUE EXAM BY PATHOLOGIST: CPT | Mod: 26 | Performed by: PATHOLOGY

## 2023-01-01 PROCEDURE — 88341 IMHCHEM/IMCYTCHM EA ADD ANTB: CPT | Mod: TC | Performed by: THORACIC SURGERY (CARDIOTHORACIC VASCULAR SURGERY)

## 2023-01-01 PROCEDURE — 36415 COLL VENOUS BLD VENIPUNCTURE: CPT

## 2023-01-01 PROCEDURE — 71045 X-RAY EXAM CHEST 1 VIEW: CPT | Mod: 26 | Performed by: RADIOLOGY

## 2023-01-01 PROCEDURE — 250N000013 HC RX MED GY IP 250 OP 250 PS 637

## 2023-01-01 PROCEDURE — 250N000011 HC RX IP 250 OP 636: Performed by: RADIOLOGY

## 2023-01-01 PROCEDURE — 36415 COLL VENOUS BLD VENIPUNCTURE: CPT | Performed by: RADIOLOGY

## 2023-01-01 PROCEDURE — 82330 ASSAY OF CALCIUM: CPT

## 2023-01-01 PROCEDURE — 250N000009 HC RX 250: Performed by: NURSE ANESTHETIST, CERTIFIED REGISTERED

## 2023-01-01 PROCEDURE — 250N000011 HC RX IP 250 OP 636: Performed by: ANESTHESIOLOGY

## 2023-01-01 PROCEDURE — 258N000003 HC RX IP 258 OP 636: Performed by: THORACIC SURGERY (CARDIOTHORACIC VASCULAR SURGERY)

## 2023-01-01 PROCEDURE — 36415 COLL VENOUS BLD VENIPUNCTURE: CPT | Performed by: STUDENT IN AN ORGANIZED HEALTH CARE EDUCATION/TRAINING PROGRAM

## 2023-01-01 PROCEDURE — 85018 HEMOGLOBIN: CPT | Performed by: RADIOLOGY

## 2023-01-01 PROCEDURE — 88342 IMHCHEM/IMCYTCHM 1ST ANTB: CPT | Mod: TC | Performed by: THORACIC SURGERY (CARDIOTHORACIC VASCULAR SURGERY)

## 2023-01-01 PROCEDURE — 85027 COMPLETE CBC AUTOMATED: CPT | Performed by: STUDENT IN AN ORGANIZED HEALTH CARE EDUCATION/TRAINING PROGRAM

## 2023-01-01 PROCEDURE — 85384 FIBRINOGEN ACTIVITY: CPT

## 2023-01-01 PROCEDURE — 99292 CRITICAL CARE ADDL 30 MIN: CPT | Performed by: INTERNAL MEDICINE

## 2023-01-01 PROCEDURE — 999N000065 XR ABDOMEN PORT 1 VIEW

## 2023-01-01 PROCEDURE — 87040 BLOOD CULTURE FOR BACTERIA: CPT | Performed by: THORACIC SURGERY (CARDIOTHORACIC VASCULAR SURGERY)

## 2023-01-01 PROCEDURE — 80053 COMPREHEN METABOLIC PANEL: CPT

## 2023-01-01 PROCEDURE — 88331 PATH CONSLTJ SURG 1 BLK 1SPC: CPT | Mod: TC | Performed by: THORACIC SURGERY (CARDIOTHORACIC VASCULAR SURGERY)

## 2023-01-01 PROCEDURE — 85025 COMPLETE CBC W/AUTO DIFF WBC: CPT

## 2023-01-01 PROCEDURE — 82805 BLOOD GASES W/O2 SATURATION: CPT

## 2023-01-01 PROCEDURE — 99284 EMERGENCY DEPT VISIT MOD MDM: CPT | Mod: 25

## 2023-01-01 PROCEDURE — 999N000141 HC STATISTIC PRE-PROCEDURE NURSING ASSESSMENT: Performed by: INTERNAL MEDICINE

## 2023-01-01 PROCEDURE — 99204 OFFICE O/P NEW MOD 45 MIN: CPT | Performed by: THORACIC SURGERY (CARDIOTHORACIC VASCULAR SURGERY)

## 2023-01-01 PROCEDURE — 710N000009 HC RECOVERY PHASE 1, LEVEL 1, PER MIN: Performed by: THORACIC SURGERY (CARDIOTHORACIC VASCULAR SURGERY)

## 2023-01-01 PROCEDURE — 71045 X-RAY EXAM CHEST 1 VIEW: CPT

## 2023-01-01 PROCEDURE — 74177 CT ABD & PELVIS W/CONTRAST: CPT

## 2023-01-01 PROCEDURE — 88173 CYTOPATH EVAL FNA REPORT: CPT | Mod: 26 | Performed by: PATHOLOGY

## 2023-01-01 PROCEDURE — 258N000003 HC RX IP 258 OP 636: Performed by: ANESTHESIOLOGY

## 2023-01-01 PROCEDURE — 80053 COMPREHEN METABOLIC PANEL: CPT | Performed by: FAMILY MEDICINE

## 2023-01-01 PROCEDURE — 70470 CT HEAD/BRAIN W/O & W/DYE: CPT

## 2023-01-01 PROCEDURE — 250N000025 HC SEVOFLURANE, PER MIN: Performed by: THORACIC SURGERY (CARDIOTHORACIC VASCULAR SURGERY)

## 2023-01-01 PROCEDURE — 82565 ASSAY OF CREATININE: CPT

## 2023-01-01 PROCEDURE — 88331 PATH CONSLTJ SURG 1 BLK 1SPC: CPT | Mod: 26 | Performed by: PATHOLOGY

## 2023-01-01 PROCEDURE — 96374 THER/PROPH/DIAG INJ IV PUSH: CPT | Mod: 59

## 2023-01-01 PROCEDURE — 84100 ASSAY OF PHOSPHORUS: CPT | Performed by: STUDENT IN AN ORGANIZED HEALTH CARE EDUCATION/TRAINING PROGRAM

## 2023-01-01 PROCEDURE — 250N000011 HC RX IP 250 OP 636: Performed by: EMERGENCY MEDICINE

## 2023-01-01 PROCEDURE — 999N000065 XR CHEST PORT 1 VIEW

## 2023-01-01 PROCEDURE — 250N000011 HC RX IP 250 OP 636: Mod: JZ | Performed by: NURSE ANESTHETIST, CERTIFIED REGISTERED

## 2023-01-01 PROCEDURE — 86901 BLOOD TYPING SEROLOGIC RH(D): CPT | Performed by: CLINICAL NURSE SPECIALIST

## 2023-01-01 PROCEDURE — 70450 CT HEAD/BRAIN W/O DYE: CPT | Mod: 26 | Performed by: RADIOLOGY

## 2023-01-01 PROCEDURE — 82248 BILIRUBIN DIRECT: CPT

## 2023-01-01 PROCEDURE — 250N000009 HC RX 250

## 2023-01-01 PROCEDURE — 93010 ELECTROCARDIOGRAM REPORT: CPT | Performed by: INTERNAL MEDICINE

## 2023-01-01 PROCEDURE — 71046 X-RAY EXAM CHEST 2 VIEWS: CPT | Mod: GC | Performed by: RADIOLOGY

## 2023-01-01 PROCEDURE — 0BU647Z SUPPLEMENT RIGHT LOWER LOBE BRONCHUS WITH AUTOLOGOUS TISSUE SUBSTITUTE, PERCUTANEOUS ENDOSCOPIC APPROACH: ICD-10-PCS | Performed by: THORACIC SURGERY (CARDIOTHORACIC VASCULAR SURGERY)

## 2023-01-01 PROCEDURE — 5A1945Z RESPIRATORY VENTILATION, 24-96 CONSECUTIVE HOURS: ICD-10-PCS | Performed by: THORACIC SURGERY (CARDIOTHORACIC VASCULAR SURGERY)

## 2023-01-01 PROCEDURE — 94726 PLETHYSMOGRAPHY LUNG VOLUMES: CPT | Performed by: INTERNAL MEDICINE

## 2023-01-01 PROCEDURE — 83735 ASSAY OF MAGNESIUM: CPT | Performed by: STUDENT IN AN ORGANIZED HEALTH CARE EDUCATION/TRAINING PROGRAM

## 2023-01-01 PROCEDURE — 258N000003 HC RX IP 258 OP 636: Performed by: NURSE ANESTHETIST, CERTIFIED REGISTERED

## 2023-01-01 PROCEDURE — 31624 DX BRONCHOSCOPE/LAVAGE: CPT

## 2023-01-01 PROCEDURE — 83605 ASSAY OF LACTIC ACID: CPT

## 2023-01-01 PROCEDURE — 74018 RADEX ABDOMEN 1 VIEW: CPT | Mod: 26 | Performed by: RADIOLOGY

## 2023-01-01 PROCEDURE — 74018 RADEX ABDOMEN 1 VIEW: CPT | Mod: 26 | Performed by: STUDENT IN AN ORGANIZED HEALTH CARE EDUCATION/TRAINING PROGRAM

## 2023-01-01 PROCEDURE — 94002 VENT MGMT INPAT INIT DAY: CPT

## 2023-01-01 PROCEDURE — 250N000011 HC RX IP 250 OP 636

## 2023-01-01 PROCEDURE — 250N000013 HC RX MED GY IP 250 OP 250 PS 637: Performed by: STUDENT IN AN ORGANIZED HEALTH CARE EDUCATION/TRAINING PROGRAM

## 2023-01-01 PROCEDURE — 93005 ELECTROCARDIOGRAM TRACING: CPT

## 2023-01-01 PROCEDURE — 120N000002 HC R&B MED SURG/OB UMMC

## 2023-01-01 PROCEDURE — 99292 CRITICAL CARE ADDL 30 MIN: CPT | Performed by: ANESTHESIOLOGY

## 2023-01-01 PROCEDURE — 0B938ZZ DRAINAGE OF RIGHT MAIN BRONCHUS, VIA NATURAL OR ARTIFICIAL OPENING ENDOSCOPIC: ICD-10-PCS | Performed by: THORACIC SURGERY (CARDIOTHORACIC VASCULAR SURGERY)

## 2023-01-01 PROCEDURE — 250N000011 HC RX IP 250 OP 636: Mod: JZ | Performed by: STUDENT IN AN ORGANIZED HEALTH CARE EDUCATION/TRAINING PROGRAM

## 2023-01-01 PROCEDURE — 32674 THORACOSCOPY LYMPH NODE EXC: CPT | Mod: GC | Performed by: THORACIC SURGERY (CARDIOTHORACIC VASCULAR SURGERY)

## 2023-01-01 PROCEDURE — 250N000012 HC RX MED GY IP 250 OP 636 PS 637: Performed by: ANESTHESIOLOGY

## 2023-01-01 PROCEDURE — 250N000009 HC RX 250: Performed by: THORACIC SURGERY (CARDIOTHORACIC VASCULAR SURGERY)

## 2023-01-01 PROCEDURE — 87205 SMEAR GRAM STAIN: CPT | Performed by: SURGERY

## 2023-01-01 PROCEDURE — C9113 INJ PANTOPRAZOLE SODIUM, VIA: HCPCS | Mod: JZ | Performed by: STUDENT IN AN ORGANIZED HEALTH CARE EDUCATION/TRAINING PROGRAM

## 2023-01-01 PROCEDURE — 88341 IMHCHEM/IMCYTCHM EA ADD ANTB: CPT | Mod: 26 | Performed by: PATHOLOGY

## 2023-01-01 PROCEDURE — 250N000009 HC RX 250: Performed by: ANESTHESIOLOGY

## 2023-01-01 PROCEDURE — 85049 AUTOMATED PLATELET COUNT: CPT | Performed by: RADIOLOGY

## 2023-01-01 PROCEDURE — 999N000248 HC STATISTIC IV INSERT WITH US BY RN

## 2023-01-01 PROCEDURE — 70450 CT HEAD/BRAIN W/O DYE: CPT

## 2023-01-01 PROCEDURE — 272N000486 CT LUNG MEDIASTINUM BIOPSY

## 2023-01-01 PROCEDURE — 250N000011 HC RX IP 250 OP 636: Mod: JZ

## 2023-01-01 PROCEDURE — 85027 COMPLETE CBC AUTOMATED: CPT

## 2023-01-01 PROCEDURE — 71045 X-RAY EXAM CHEST 1 VIEW: CPT | Mod: 26 | Performed by: STUDENT IN AN ORGANIZED HEALTH CARE EDUCATION/TRAINING PROGRAM

## 2023-01-01 PROCEDURE — 71250 CT THORAX DX C-: CPT | Mod: 26 | Performed by: STUDENT IN AN ORGANIZED HEALTH CARE EDUCATION/TRAINING PROGRAM

## 2023-01-01 PROCEDURE — 250N000009 HC RX 250: Performed by: STUDENT IN AN ORGANIZED HEALTH CARE EDUCATION/TRAINING PROGRAM

## 2023-01-01 PROCEDURE — 97110 THERAPEUTIC EXERCISES: CPT | Mod: GO

## 2023-01-01 PROCEDURE — 0B978ZZ DRAINAGE OF LEFT MAIN BRONCHUS, VIA NATURAL OR ARTIFICIAL OPENING ENDOSCOPIC: ICD-10-PCS | Performed by: THORACIC SURGERY (CARDIOTHORACIC VASCULAR SURGERY)

## 2023-01-01 PROCEDURE — 370N000017 HC ANESTHESIA TECHNICAL FEE, PER MIN: Performed by: THORACIC SURGERY (CARDIOTHORACIC VASCULAR SURGERY)

## 2023-01-01 PROCEDURE — 258N000003 HC RX IP 258 OP 636: Performed by: STUDENT IN AN ORGANIZED HEALTH CARE EDUCATION/TRAINING PROGRAM

## 2023-01-01 PROCEDURE — 999N000157 HC STATISTIC RCP TIME EA 10 MIN

## 2023-01-01 PROCEDURE — 99285 EMERGENCY DEPT VISIT HI MDM: CPT | Mod: 25

## 2023-01-01 PROCEDURE — 85610 PROTHROMBIN TIME: CPT

## 2023-01-01 PROCEDURE — 710N000012 HC RECOVERY PHASE 2, PER MINUTE: Performed by: INTERNAL MEDICINE

## 2023-01-01 PROCEDURE — 80048 BASIC METABOLIC PNL TOTAL CA: CPT | Performed by: STUDENT IN AN ORGANIZED HEALTH CARE EDUCATION/TRAINING PROGRAM

## 2023-01-01 PROCEDURE — 85610 PROTHROMBIN TIME: CPT | Performed by: RADIOLOGY

## 2023-01-01 PROCEDURE — 96375 TX/PRO/DX INJ NEW DRUG ADDON: CPT

## 2023-01-01 PROCEDURE — 250N000011 HC RX IP 250 OP 636: Performed by: STUDENT IN AN ORGANIZED HEALTH CARE EDUCATION/TRAINING PROGRAM

## 2023-01-01 PROCEDURE — 97165 OT EVAL LOW COMPLEX 30 MIN: CPT | Mod: GO

## 2023-01-01 PROCEDURE — 99215 OFFICE O/P EST HI 40 MIN: CPT | Performed by: CLINICAL NURSE SPECIALIST

## 2023-01-01 PROCEDURE — 81455 SO/HL 51/>GSAP DNA/DNA&RNA: CPT | Performed by: THORACIC SURGERY (CARDIOTHORACIC VASCULAR SURGERY)

## 2023-01-01 PROCEDURE — 94729 DIFFUSING CAPACITY: CPT | Performed by: INTERNAL MEDICINE

## 2023-01-01 PROCEDURE — 88342 IMHCHEM/IMCYTCHM 1ST ANTB: CPT | Mod: 26 | Performed by: PATHOLOGY

## 2023-01-01 PROCEDURE — 71046 X-RAY EXAM CHEST 2 VIEWS: CPT

## 2023-01-01 PROCEDURE — 250N000011 HC RX IP 250 OP 636: Mod: JZ | Performed by: ANESTHESIOLOGY

## 2023-01-01 PROCEDURE — 80061 LIPID PANEL: CPT | Performed by: FAMILY MEDICINE

## 2023-01-01 PROCEDURE — 84443 ASSAY THYROID STIM HORMONE: CPT | Performed by: FAMILY MEDICINE

## 2023-01-01 PROCEDURE — 71045 X-RAY EXAM CHEST 1 VIEW: CPT | Mod: 76

## 2023-01-01 PROCEDURE — 250N000011 HC RX IP 250 OP 636: Mod: JZ | Performed by: THORACIC SURGERY (CARDIOTHORACIC VASCULAR SURGERY)

## 2023-01-01 PROCEDURE — 88309 TISSUE EXAM BY PATHOLOGIST: CPT | Mod: 26 | Performed by: PATHOLOGY

## 2023-01-01 PROCEDURE — 250N000013 HC RX MED GY IP 250 OP 250 PS 637: Performed by: CLINICAL NURSE SPECIALIST

## 2023-01-01 PROCEDURE — 84132 ASSAY OF SERUM POTASSIUM: CPT | Performed by: THORACIC SURGERY (CARDIOTHORACIC VASCULAR SURGERY)

## 2023-01-01 PROCEDURE — 83735 ASSAY OF MAGNESIUM: CPT

## 2023-01-01 PROCEDURE — 36415 COLL VENOUS BLD VENIPUNCTURE: CPT | Performed by: THORACIC SURGERY (CARDIOTHORACIC VASCULAR SURGERY)

## 2023-01-01 PROCEDURE — 94060 EVALUATION OF WHEEZING: CPT | Performed by: INTERNAL MEDICINE

## 2023-01-01 PROCEDURE — 84100 ASSAY OF PHOSPHORUS: CPT

## 2023-01-01 PROCEDURE — 99291 CRITICAL CARE FIRST HOUR: CPT | Performed by: ANESTHESIOLOGY

## 2023-01-01 PROCEDURE — 71250 CT THORAX DX C-: CPT

## 2023-01-01 PROCEDURE — 250N000011 HC RX IP 250 OP 636: Performed by: THORACIC SURGERY (CARDIOTHORACIC VASCULAR SURGERY)

## 2023-01-01 PROCEDURE — 250N000013 HC RX MED GY IP 250 OP 250 PS 637: Performed by: EMERGENCY MEDICINE

## 2023-01-01 PROCEDURE — 87086 URINE CULTURE/COLONY COUNT: CPT

## 2023-01-01 PROCEDURE — P9016 RBC LEUKOCYTES REDUCED: HCPCS

## 2023-01-01 PROCEDURE — 200N000002 HC R&B ICU UMMC

## 2023-01-01 PROCEDURE — 32663 THORACOSCOPY W/LOBECTOMY: CPT | Mod: RT | Performed by: THORACIC SURGERY (CARDIOTHORACIC VASCULAR SURGERY)

## 2023-01-01 PROCEDURE — 81001 URINALYSIS AUTO W/SCOPE: CPT

## 2023-01-01 PROCEDURE — 87040 BLOOD CULTURE FOR BACTERIA: CPT

## 2023-01-01 PROCEDURE — 71045 X-RAY EXAM CHEST 1 VIEW: CPT | Mod: 77

## 2023-01-01 PROCEDURE — 99283 EMERGENCY DEPT VISIT LOW MDM: CPT

## 2023-01-01 PROCEDURE — 07B74ZX EXCISION OF THORAX LYMPHATIC, PERCUTANEOUS ENDOSCOPIC APPROACH, DIAGNOSTIC: ICD-10-PCS | Performed by: THORACIC SURGERY (CARDIOTHORACIC VASCULAR SURGERY)

## 2023-01-01 PROCEDURE — G0452 MOLECULAR PATHOLOGY INTERPR: HCPCS | Mod: 26 | Performed by: PATHOLOGY

## 2023-01-01 PROCEDURE — 83690 ASSAY OF LIPASE: CPT

## 2023-01-01 PROCEDURE — 88360 TUMOR IMMUNOHISTOCHEM/MANUAL: CPT | Mod: 26 | Performed by: PATHOLOGY

## 2023-01-01 PROCEDURE — 88332 PATH CONSLTJ SURG EA ADD BLK: CPT | Mod: 26 | Performed by: PATHOLOGY

## 2023-01-01 PROCEDURE — 999N000147 HC STATISTIC PT IP EVAL DEFER

## 2023-01-01 PROCEDURE — 88333 PATH CONSLTJ SURG CYTO XM 1: CPT | Mod: 26 | Performed by: PATHOLOGY

## 2023-01-01 PROCEDURE — 360N000077 HC SURGERY LEVEL 4, PER MIN: Performed by: INTERNAL MEDICINE

## 2023-01-01 PROCEDURE — 85018 HEMOGLOBIN: CPT | Performed by: INTERNAL MEDICINE

## 2023-01-01 PROCEDURE — 82962 GLUCOSE BLOOD TEST: CPT

## 2023-01-01 PROCEDURE — 272N000001 HC OR GENERAL SUPPLY STERILE: Performed by: THORACIC SURGERY (CARDIOTHORACIC VASCULAR SURGERY)

## 2023-01-01 PROCEDURE — 86923 COMPATIBILITY TEST ELECTRIC: CPT

## 2023-01-01 PROCEDURE — 250N000011 HC RX IP 250 OP 636: Performed by: REGISTERED NURSE

## 2023-01-01 PROCEDURE — 97530 THERAPEUTIC ACTIVITIES: CPT | Mod: GO

## 2023-01-01 PROCEDURE — 360N000077 HC SURGERY LEVEL 4, PER MIN: Performed by: THORACIC SURGERY (CARDIOTHORACIC VASCULAR SURGERY)

## 2023-01-01 PROCEDURE — 36415 COLL VENOUS BLD VENIPUNCTURE: CPT | Performed by: PATHOLOGY

## 2023-01-01 PROCEDURE — 370N000017 HC ANESTHESIA TECHNICAL FEE, PER MIN: Performed by: INTERNAL MEDICINE

## 2023-01-01 PROCEDURE — C9113 INJ PANTOPRAZOLE SODIUM, VIA: HCPCS

## 2023-01-01 PROCEDURE — 999N000141 HC STATISTIC PRE-PROCEDURE NURSING ASSESSMENT: Performed by: THORACIC SURGERY (CARDIOTHORACIC VASCULAR SURGERY)

## 2023-01-01 PROCEDURE — 84145 PROCALCITONIN (PCT): CPT

## 2023-01-01 PROCEDURE — 84484 ASSAY OF TROPONIN QUANT: CPT

## 2023-01-01 PROCEDURE — 0BTF4ZZ RESECTION OF RIGHT LOWER LUNG LOBE, PERCUTANEOUS ENDOSCOPIC APPROACH: ICD-10-PCS | Performed by: THORACIC SURGERY (CARDIOTHORACIC VASCULAR SURGERY)

## 2023-01-01 PROCEDURE — 272N000001 HC OR GENERAL SUPPLY STERILE: Performed by: INTERNAL MEDICINE

## 2023-01-01 PROCEDURE — 88305 TISSUE EXAM BY PATHOLOGIST: CPT | Mod: TC | Performed by: INTERNAL MEDICINE

## 2023-01-01 PROCEDURE — 250N000025 HC SEVOFLURANE, PER MIN: Performed by: INTERNAL MEDICINE

## 2023-01-01 PROCEDURE — 999N000185 HC STATISTIC TRANSPORT TIME EA 15 MIN

## 2023-01-01 PROCEDURE — 710N000010 HC RECOVERY PHASE 1, LEVEL 2, PER MIN: Performed by: INTERNAL MEDICINE

## 2023-01-01 PROCEDURE — 99203 OFFICE O/P NEW LOW 30 MIN: CPT | Performed by: CLINICAL NURSE SPECIALIST

## 2023-01-01 PROCEDURE — 82565 ASSAY OF CREATININE: CPT | Performed by: STUDENT IN AN ORGANIZED HEALTH CARE EDUCATION/TRAINING PROGRAM

## 2023-01-01 PROCEDURE — 88332 PATH CONSLTJ SURG EA ADD BLK: CPT | Mod: TC | Performed by: THORACIC SURGERY (CARDIOTHORACIC VASCULAR SURGERY)

## 2023-01-01 PROCEDURE — 85730 THROMBOPLASTIN TIME PARTIAL: CPT | Performed by: RADIOLOGY

## 2023-01-01 RX ORDER — FENTANYL CITRATE 50 UG/ML
INJECTION, SOLUTION INTRAMUSCULAR; INTRAVENOUS PRN
Status: DISCONTINUED | OUTPATIENT
Start: 2023-01-01 | End: 2023-01-01

## 2023-01-01 RX ORDER — SODIUM CHLORIDE, SODIUM LACTATE, POTASSIUM CHLORIDE, CALCIUM CHLORIDE 600; 310; 30; 20 MG/100ML; MG/100ML; MG/100ML; MG/100ML
INJECTION, SOLUTION INTRAVENOUS CONTINUOUS
Status: DISCONTINUED | OUTPATIENT
Start: 2023-01-01 | End: 2023-01-01 | Stop reason: HOSPADM

## 2023-01-01 RX ORDER — LEVOTHYROXINE SODIUM ANHYDROUS 100 UG/5ML
100 INJECTION, POWDER, LYOPHILIZED, FOR SOLUTION INTRAVENOUS DAILY
Status: DISCONTINUED | OUTPATIENT
Start: 2023-01-01 | End: 2023-01-01

## 2023-01-01 RX ORDER — LIDOCAINE 40 MG/G
CREAM TOPICAL
Status: DISCONTINUED | OUTPATIENT
Start: 2023-01-01 | End: 2023-01-01 | Stop reason: HOSPADM

## 2023-01-01 RX ORDER — HYDRALAZINE HYDROCHLORIDE 20 MG/ML
10-20 INJECTION INTRAMUSCULAR; INTRAVENOUS EVERY 30 MIN PRN
Status: DISCONTINUED | OUTPATIENT
Start: 2023-01-01 | End: 2023-01-01

## 2023-01-01 RX ORDER — FENTANYL CITRATE 50 UG/ML
50 INJECTION, SOLUTION INTRAMUSCULAR; INTRAVENOUS EVERY 10 MIN PRN
Status: ACTIVE | OUTPATIENT
Start: 2023-01-01 | End: 2023-01-01

## 2023-01-01 RX ORDER — LIDOCAINE HYDROCHLORIDE 10 MG/ML
INJECTION, SOLUTION EPIDURAL; INFILTRATION; INTRACAUDAL; PERINEURAL
Status: COMPLETED
Start: 2023-01-01 | End: 2023-01-01

## 2023-01-01 RX ORDER — ONDANSETRON 2 MG/ML
4 INJECTION INTRAMUSCULAR; INTRAVENOUS EVERY 30 MIN PRN
Status: DISCONTINUED | OUTPATIENT
Start: 2023-01-01 | End: 2023-01-01 | Stop reason: HOSPADM

## 2023-01-01 RX ORDER — ONDANSETRON 4 MG/1
4 TABLET, ORALLY DISINTEGRATING ORAL EVERY 30 MIN PRN
Status: DISCONTINUED | OUTPATIENT
Start: 2023-01-01 | End: 2023-01-01 | Stop reason: HOSPADM

## 2023-01-01 RX ORDER — EPHEDRINE SULFATE 50 MG/ML
INJECTION, SOLUTION INTRAMUSCULAR; INTRAVENOUS; SUBCUTANEOUS PRN
Status: DISCONTINUED | OUTPATIENT
Start: 2023-01-01 | End: 2023-01-01

## 2023-01-01 RX ORDER — LABETALOL HYDROCHLORIDE 5 MG/ML
10-40 INJECTION, SOLUTION INTRAVENOUS EVERY 10 MIN PRN
Status: DISCONTINUED | OUTPATIENT
Start: 2023-01-01 | End: 2023-01-01

## 2023-01-01 RX ORDER — CHLORHEXIDINE GLUCONATE ORAL RINSE 1.2 MG/ML
15 SOLUTION DENTAL ONCE
Status: CANCELLED | OUTPATIENT
Start: 2023-01-01 | End: 2023-01-01

## 2023-01-01 RX ORDER — OXYCODONE HYDROCHLORIDE 5 MG/1
2.5 TABLET ORAL EVERY 6 HOURS PRN
COMMUNITY
End: 2023-01-01

## 2023-01-01 RX ORDER — GLYCOPYRROLATE 0.2 MG/ML
0.2 INJECTION, SOLUTION INTRAMUSCULAR; INTRAVENOUS ONCE
Status: COMPLETED | OUTPATIENT
Start: 2023-01-01 | End: 2023-01-01

## 2023-01-01 RX ORDER — HYDROMORPHONE HCL IN WATER/PF 6 MG/30 ML
0.2 PATIENT CONTROLLED ANALGESIA SYRINGE INTRAVENOUS EVERY 5 MIN PRN
Status: DISCONTINUED | OUTPATIENT
Start: 2023-01-01 | End: 2023-01-01 | Stop reason: HOSPADM

## 2023-01-01 RX ORDER — POLYETHYLENE GLYCOL 3350 17 G/17G
17 POWDER, FOR SOLUTION ORAL DAILY
Status: DISCONTINUED | OUTPATIENT
Start: 2023-01-01 | End: 2023-01-01 | Stop reason: HOSPADM

## 2023-01-01 RX ORDER — IBUPROFEN 200 MG
400 TABLET ORAL EVERY 4 HOURS PRN
COMMUNITY

## 2023-01-01 RX ORDER — LEVOTHYROXINE SODIUM 112 UG/1
112 TABLET ORAL
Status: DISCONTINUED | OUTPATIENT
Start: 2023-01-01 | End: 2023-01-01

## 2023-01-01 RX ORDER — LABETALOL HYDROCHLORIDE 5 MG/ML
INJECTION, SOLUTION INTRAVENOUS
Status: COMPLETED
Start: 2023-01-01 | End: 2023-01-01

## 2023-01-01 RX ORDER — POTASSIUM CHLORIDE 7.45 MG/ML
10 INJECTION INTRAVENOUS
Status: COMPLETED | OUTPATIENT
Start: 2023-01-01 | End: 2023-01-01

## 2023-01-01 RX ORDER — NALOXONE HYDROCHLORIDE 0.4 MG/ML
0.4 INJECTION, SOLUTION INTRAMUSCULAR; INTRAVENOUS; SUBCUTANEOUS
Status: DISCONTINUED | OUTPATIENT
Start: 2023-01-01 | End: 2023-01-01 | Stop reason: HOSPADM

## 2023-01-01 RX ORDER — PROPOFOL 10 MG/ML
30 INJECTION, EMULSION INTRAVENOUS CONTINUOUS
Status: DISCONTINUED | OUTPATIENT
Start: 2023-01-01 | End: 2023-01-01

## 2023-01-01 RX ORDER — HYDROCODONE BITARTRATE AND ACETAMINOPHEN 5; 325 MG/1; MG/1
1-2 TABLET ORAL EVERY 4 HOURS PRN
Status: DISCONTINUED | OUTPATIENT
Start: 2023-01-01 | End: 2023-01-01

## 2023-01-01 RX ORDER — PROCHLORPERAZINE MALEATE 5 MG
5 TABLET ORAL EVERY 6 HOURS PRN
Status: DISCONTINUED | OUTPATIENT
Start: 2023-01-01 | End: 2023-01-01 | Stop reason: HOSPADM

## 2023-01-01 RX ORDER — PANTOPRAZOLE SODIUM 40 MG/1
40 TABLET, DELAYED RELEASE ORAL
Status: DISCONTINUED | OUTPATIENT
Start: 2023-01-01 | End: 2023-01-01 | Stop reason: HOSPADM

## 2023-01-01 RX ORDER — HYDROMORPHONE HYDROCHLORIDE 1 MG/ML
0.2 INJECTION, SOLUTION INTRAMUSCULAR; INTRAVENOUS; SUBCUTANEOUS EVERY 5 MIN PRN
Status: DISCONTINUED | OUTPATIENT
Start: 2023-01-01 | End: 2023-01-01 | Stop reason: HOSPADM

## 2023-01-01 RX ORDER — TIMOLOL MALEATE 5 MG/ML
1 SOLUTION/ DROPS OPHTHALMIC 2 TIMES DAILY
Status: DISCONTINUED | OUTPATIENT
Start: 2023-01-01 | End: 2023-01-01 | Stop reason: HOSPADM

## 2023-01-01 RX ORDER — SODIUM CHLORIDE, SODIUM LACTATE, POTASSIUM CHLORIDE, CALCIUM CHLORIDE 600; 310; 30; 20 MG/100ML; MG/100ML; MG/100ML; MG/100ML
INJECTION, SOLUTION INTRAVENOUS CONTINUOUS
Status: CANCELLED | OUTPATIENT
Start: 2023-01-01

## 2023-01-01 RX ORDER — SODIUM CHLORIDE, SODIUM LACTATE, POTASSIUM CHLORIDE, CALCIUM CHLORIDE 600; 310; 30; 20 MG/100ML; MG/100ML; MG/100ML; MG/100ML
INJECTION, SOLUTION INTRAVENOUS CONTINUOUS PRN
Status: DISCONTINUED | OUTPATIENT
Start: 2023-01-01 | End: 2023-01-01

## 2023-01-01 RX ORDER — ONDANSETRON 4 MG/1
4 TABLET, ORALLY DISINTEGRATING ORAL ONCE
Status: COMPLETED | OUTPATIENT
Start: 2023-01-01 | End: 2023-01-01

## 2023-01-01 RX ORDER — ENOXAPARIN SODIUM 100 MG/ML
40 INJECTION SUBCUTANEOUS
Status: CANCELLED | OUTPATIENT
Start: 2023-01-01

## 2023-01-01 RX ORDER — FLUMAZENIL 0.1 MG/ML
0.2 INJECTION, SOLUTION INTRAVENOUS
Status: DISCONTINUED | OUTPATIENT
Start: 2023-01-01 | End: 2023-01-01 | Stop reason: HOSPADM

## 2023-01-01 RX ORDER — ACETAMINOPHEN 325 MG/1
650 TABLET ORAL EVERY 4 HOURS PRN
Status: DISCONTINUED | OUTPATIENT
Start: 2023-01-01 | End: 2023-01-01 | Stop reason: HOSPADM

## 2023-01-01 RX ORDER — CHLORHEXIDINE GLUCONATE ORAL RINSE 1.2 MG/ML
15 SOLUTION DENTAL EVERY 12 HOURS
Status: DISCONTINUED | OUTPATIENT
Start: 2023-01-01 | End: 2023-01-01

## 2023-01-01 RX ORDER — FENTANYL CITRATE 50 UG/ML
25 INJECTION, SOLUTION INTRAMUSCULAR; INTRAVENOUS EVERY 5 MIN PRN
Status: DISCONTINUED | OUTPATIENT
Start: 2023-01-01 | End: 2023-01-01 | Stop reason: HOSPADM

## 2023-01-01 RX ORDER — OXYCODONE HYDROCHLORIDE 5 MG/1
10 TABLET ORAL
Status: DISCONTINUED | OUTPATIENT
Start: 2023-01-01 | End: 2023-01-01 | Stop reason: HOSPADM

## 2023-01-01 RX ORDER — LIDOCAINE 4 G/G
1 PATCH TOPICAL ONCE
Status: DISCONTINUED | OUTPATIENT
Start: 2023-01-01 | End: 2023-01-01 | Stop reason: HOSPADM

## 2023-01-01 RX ORDER — OXYCODONE HYDROCHLORIDE 5 MG/1
5 TABLET ORAL EVERY 4 HOURS PRN
Status: DISCONTINUED | OUTPATIENT
Start: 2023-01-01 | End: 2023-01-01

## 2023-01-01 RX ORDER — ENOXAPARIN SODIUM 100 MG/ML
40 INJECTION SUBCUTANEOUS EVERY 24 HOURS
Qty: 1.6 ML | Refills: 0 | Status: SHIPPED | OUTPATIENT
Start: 2023-01-01 | End: 2023-01-01

## 2023-01-01 RX ORDER — PROPOFOL 10 MG/ML
INJECTION, EMULSION INTRAVENOUS PRN
Status: DISCONTINUED | OUTPATIENT
Start: 2023-01-01 | End: 2023-01-01

## 2023-01-01 RX ORDER — LEVETIRACETAM 500 MG/1
1000 TABLET ORAL EVERY 12 HOURS
Status: DISCONTINUED | OUTPATIENT
Start: 2023-01-01 | End: 2023-01-01 | Stop reason: HOSPADM

## 2023-01-01 RX ORDER — AMOXICILLIN 250 MG
1 CAPSULE ORAL 2 TIMES DAILY
Status: DISCONTINUED | OUTPATIENT
Start: 2023-01-01 | End: 2023-01-01 | Stop reason: HOSPADM

## 2023-01-01 RX ORDER — HYDROMORPHONE HCL IN WATER/PF 6 MG/30 ML
0.2 PATIENT CONTROLLED ANALGESIA SYRINGE INTRAVENOUS
Status: DISCONTINUED | OUTPATIENT
Start: 2023-01-01 | End: 2023-01-01 | Stop reason: HOSPADM

## 2023-01-01 RX ORDER — ONDANSETRON 4 MG/1
4 TABLET, ORALLY DISINTEGRATING ORAL EVERY 6 HOURS PRN
Status: DISCONTINUED | OUTPATIENT
Start: 2023-01-01 | End: 2023-01-01 | Stop reason: HOSPADM

## 2023-01-01 RX ORDER — OXYCODONE HYDROCHLORIDE 5 MG/1
5 TABLET ORAL ONCE
Status: COMPLETED | OUTPATIENT
Start: 2023-01-01 | End: 2023-01-01

## 2023-01-01 RX ORDER — CYCLOBENZAPRINE HCL 5 MG
5 TABLET ORAL
Status: DISCONTINUED | OUTPATIENT
Start: 2023-01-01 | End: 2023-01-01 | Stop reason: HOSPADM

## 2023-01-01 RX ORDER — GABAPENTIN 100 MG/1
100 CAPSULE ORAL 3 TIMES DAILY
Status: DISCONTINUED | OUTPATIENT
Start: 2023-01-01 | End: 2023-01-01 | Stop reason: HOSPADM

## 2023-01-01 RX ORDER — ONDANSETRON 4 MG/1
4 TABLET, ORALLY DISINTEGRATING ORAL EVERY 8 HOURS PRN
Qty: 10 TABLET | Refills: 0 | Status: SHIPPED | OUTPATIENT
Start: 2023-01-01 | End: 2023-01-01

## 2023-01-01 RX ORDER — CYCLOBENZAPRINE HCL 10 MG
10 TABLET ORAL ONCE
Status: COMPLETED | OUTPATIENT
Start: 2023-01-01 | End: 2023-01-01

## 2023-01-01 RX ORDER — OXYCODONE HYDROCHLORIDE 10 MG/1
10 TABLET ORAL
Status: DISCONTINUED | OUTPATIENT
Start: 2023-01-01 | End: 2023-01-01 | Stop reason: HOSPADM

## 2023-01-01 RX ORDER — POLYETHYLENE GLYCOL 3350 17 G/17G
17 POWDER, FOR SOLUTION ORAL DAILY
Qty: 7 PACKET | Refills: 0 | Status: SHIPPED | OUTPATIENT
Start: 2023-01-01

## 2023-01-01 RX ORDER — CHLORHEXIDINE GLUCONATE ORAL RINSE 1.2 MG/ML
15 SOLUTION DENTAL ONCE
Status: COMPLETED | OUTPATIENT
Start: 2023-01-01 | End: 2023-01-01

## 2023-01-01 RX ORDER — HYDROMORPHONE HYDROCHLORIDE 1 MG/ML
0.4 INJECTION, SOLUTION INTRAMUSCULAR; INTRAVENOUS; SUBCUTANEOUS EVERY 5 MIN PRN
Status: DISCONTINUED | OUTPATIENT
Start: 2023-01-01 | End: 2023-01-01 | Stop reason: HOSPADM

## 2023-01-01 RX ORDER — HEPARIN SODIUM 5000 [USP'U]/.5ML
5000 INJECTION, SOLUTION INTRAVENOUS; SUBCUTANEOUS EVERY 8 HOURS
Status: DISCONTINUED | OUTPATIENT
Start: 2023-01-01 | End: 2023-01-01

## 2023-01-01 RX ORDER — ENOXAPARIN SODIUM 100 MG/ML
40 INJECTION SUBCUTANEOUS
Status: COMPLETED | OUTPATIENT
Start: 2023-01-01 | End: 2023-01-01

## 2023-01-01 RX ORDER — HYDROMORPHONE HCL IN WATER/PF 6 MG/30 ML
0.4 PATIENT CONTROLLED ANALGESIA SYRINGE INTRAVENOUS EVERY 5 MIN PRN
Status: DISCONTINUED | OUTPATIENT
Start: 2023-01-01 | End: 2023-01-01 | Stop reason: HOSPADM

## 2023-01-01 RX ORDER — ONDANSETRON 2 MG/ML
4 INJECTION INTRAMUSCULAR; INTRAVENOUS ONCE
Status: COMPLETED | OUTPATIENT
Start: 2023-01-01 | End: 2023-01-01

## 2023-01-01 RX ORDER — ACETAMINOPHEN 325 MG/1
975 TABLET ORAL EVERY 8 HOURS
Status: DISCONTINUED | OUTPATIENT
Start: 2023-01-01 | End: 2023-01-01

## 2023-01-01 RX ORDER — BISACODYL 10 MG
10 SUPPOSITORY, RECTAL RECTAL DAILY PRN
Status: DISCONTINUED | OUTPATIENT
Start: 2023-01-01 | End: 2023-01-01 | Stop reason: HOSPADM

## 2023-01-01 RX ORDER — OXYCODONE HYDROCHLORIDE 5 MG/1
5 TABLET ORAL
Status: DISCONTINUED | OUTPATIENT
Start: 2023-01-01 | End: 2023-01-01 | Stop reason: HOSPADM

## 2023-01-01 RX ORDER — METHOCARBAMOL 500 MG/1
500 TABLET, FILM COATED ORAL EVERY 6 HOURS PRN
Qty: 40 TABLET | Refills: 0 | Status: SHIPPED | OUTPATIENT
Start: 2023-01-01

## 2023-01-01 RX ORDER — CEFAZOLIN SODIUM/WATER 2 G/20 ML
2 SYRINGE (ML) INTRAVENOUS SEE ADMIN INSTRUCTIONS
Status: DISCONTINUED | OUTPATIENT
Start: 2023-01-01 | End: 2023-01-01 | Stop reason: HOSPADM

## 2023-01-01 RX ORDER — DEXAMETHASONE SODIUM PHOSPHATE 10 MG/ML
INJECTION, SOLUTION INTRAMUSCULAR; INTRAVENOUS PRN
Status: DISCONTINUED | OUTPATIENT
Start: 2023-01-01 | End: 2023-01-01

## 2023-01-01 RX ORDER — POTASSIUM PHOS IN 0.9 % NACL 15MMOL/250
15 PLASTIC BAG, INJECTION (ML) INTRAVENOUS ONCE
Status: COMPLETED | OUTPATIENT
Start: 2023-01-01 | End: 2023-01-01

## 2023-01-01 RX ORDER — NALOXONE HYDROCHLORIDE 0.4 MG/ML
0.2 INJECTION, SOLUTION INTRAMUSCULAR; INTRAVENOUS; SUBCUTANEOUS
Status: DISCONTINUED | OUTPATIENT
Start: 2023-01-01 | End: 2023-01-01 | Stop reason: HOSPADM

## 2023-01-01 RX ORDER — OXYCODONE HYDROCHLORIDE 5 MG/1
5 TABLET ORAL EVERY 6 HOURS PRN
Qty: 6 TABLET | Refills: 0 | Status: SHIPPED | OUTPATIENT
Start: 2023-01-01 | End: 2023-01-01

## 2023-01-01 RX ORDER — CYCLOBENZAPRINE HCL 5 MG
5 TABLET ORAL
COMMUNITY
Start: 2023-01-01 | End: 2023-01-01

## 2023-01-01 RX ORDER — ACETAMINOPHEN 325 MG/1
650 TABLET ORAL EVERY 8 HOURS
Status: DISCONTINUED | OUTPATIENT
Start: 2023-01-01 | End: 2023-01-01 | Stop reason: HOSPADM

## 2023-01-01 RX ORDER — ENOXAPARIN SODIUM 100 MG/ML
40 INJECTION SUBCUTANEOUS EVERY 24 HOURS
Status: DISCONTINUED | OUTPATIENT
Start: 2023-01-01 | End: 2023-01-01 | Stop reason: HOSPADM

## 2023-01-01 RX ORDER — LIDOCAINE HYDROCHLORIDE 10 MG/ML
INJECTION, SOLUTION INFILTRATION; PERINEURAL PRN
Status: DISCONTINUED | OUTPATIENT
Start: 2023-01-01 | End: 2023-01-01

## 2023-01-01 RX ORDER — LATANOPROST 50 UG/ML
1 SOLUTION/ DROPS OPHTHALMIC AT BEDTIME
Status: DISCONTINUED | OUTPATIENT
Start: 2023-01-01 | End: 2023-01-01 | Stop reason: HOSPADM

## 2023-01-01 RX ORDER — DEXTROSE MONOHYDRATE 25 G/50ML
25-50 INJECTION, SOLUTION INTRAVENOUS
Status: DISCONTINUED | OUTPATIENT
Start: 2023-01-01 | End: 2023-01-01 | Stop reason: HOSPADM

## 2023-01-01 RX ORDER — PROPOFOL 10 MG/ML
INJECTION, EMULSION INTRAVENOUS CONTINUOUS PRN
Status: DISCONTINUED | OUTPATIENT
Start: 2023-01-01 | End: 2023-01-01

## 2023-01-01 RX ORDER — HYDROCODONE BITARTRATE AND ACETAMINOPHEN 5; 325 MG/1; MG/1
2 TABLET ORAL EVERY 4 HOURS PRN
Qty: 40 TABLET | Refills: 0 | Status: SHIPPED | OUTPATIENT
Start: 2023-01-01

## 2023-01-01 RX ORDER — ACETAMINOPHEN 325 MG/1
975 TABLET ORAL ONCE
Status: CANCELLED | OUTPATIENT
Start: 2023-01-01 | End: 2023-01-01

## 2023-01-01 RX ORDER — HYDROCODONE BITARTRATE AND ACETAMINOPHEN 5; 325 MG/1; MG/1
2 TABLET ORAL EVERY 4 HOURS PRN
Status: DISCONTINUED | OUTPATIENT
Start: 2023-01-01 | End: 2023-01-01 | Stop reason: HOSPADM

## 2023-01-01 RX ORDER — LEVETIRACETAM 10 MG/ML
1000 INJECTION INTRAVASCULAR EVERY 12 HOURS
Status: DISCONTINUED | OUTPATIENT
Start: 2023-01-01 | End: 2023-01-01

## 2023-01-01 RX ORDER — DEXAMETHASONE SODIUM PHOSPHATE 4 MG/ML
INJECTION, SOLUTION INTRA-ARTICULAR; INTRALESIONAL; INTRAMUSCULAR; INTRAVENOUS; SOFT TISSUE PRN
Status: DISCONTINUED | OUTPATIENT
Start: 2023-01-01 | End: 2023-01-01

## 2023-01-01 RX ORDER — FENTANYL CITRATE 50 UG/ML
50 INJECTION, SOLUTION INTRAMUSCULAR; INTRAVENOUS EVERY 30 MIN PRN
Status: DISCONTINUED | OUTPATIENT
Start: 2023-01-01 | End: 2023-01-01 | Stop reason: HOSPADM

## 2023-01-01 RX ORDER — GABAPENTIN 100 MG/1
100 CAPSULE ORAL
Status: COMPLETED | OUTPATIENT
Start: 2023-01-01 | End: 2023-01-01

## 2023-01-01 RX ORDER — TIMOLOL MALEATE 5 MG/ML
1 SOLUTION/ DROPS OPHTHALMIC 2 TIMES DAILY
COMMUNITY
Start: 2022-01-01

## 2023-01-01 RX ORDER — OXYCODONE HYDROCHLORIDE 5 MG/1
5 TABLET ORAL EVERY 4 HOURS PRN
Qty: 12 TABLET | Refills: 0 | Status: SHIPPED | OUTPATIENT
Start: 2023-01-01 | End: 2023-01-01

## 2023-01-01 RX ORDER — FENTANYL CITRATE 50 UG/ML
50 INJECTION, SOLUTION INTRAMUSCULAR; INTRAVENOUS EVERY 5 MIN PRN
Status: DISCONTINUED | OUTPATIENT
Start: 2023-01-01 | End: 2023-01-01 | Stop reason: HOSPADM

## 2023-01-01 RX ORDER — PIPERACILLIN SODIUM, TAZOBACTAM SODIUM 3; .375 G/15ML; G/15ML
3.38 INJECTION, POWDER, LYOPHILIZED, FOR SOLUTION INTRAVENOUS EVERY 6 HOURS
Status: DISCONTINUED | OUTPATIENT
Start: 2023-01-01 | End: 2023-01-01

## 2023-01-01 RX ORDER — LIDOCAINE 40 MG/G
CREAM TOPICAL
Status: CANCELLED | OUTPATIENT
Start: 2023-01-01

## 2023-01-01 RX ORDER — ONDANSETRON 2 MG/ML
4 INJECTION INTRAMUSCULAR; INTRAVENOUS EVERY 6 HOURS PRN
Status: DISCONTINUED | OUTPATIENT
Start: 2023-01-01 | End: 2023-01-01 | Stop reason: HOSPADM

## 2023-01-01 RX ORDER — NICOTINE POLACRILEX 4 MG
15-30 LOZENGE BUCCAL
Status: DISCONTINUED | OUTPATIENT
Start: 2023-01-01 | End: 2023-01-01 | Stop reason: HOSPADM

## 2023-01-01 RX ORDER — OXYCODONE HYDROCHLORIDE 10 MG/1
10 TABLET ORAL EVERY 4 HOURS PRN
Status: DISCONTINUED | OUTPATIENT
Start: 2023-01-01 | End: 2023-01-01

## 2023-01-01 RX ORDER — IOPAMIDOL 755 MG/ML
75 INJECTION, SOLUTION INTRAVASCULAR ONCE
Status: COMPLETED | OUTPATIENT
Start: 2023-01-01 | End: 2023-01-01

## 2023-01-01 RX ORDER — MAGNESIUM SULFATE HEPTAHYDRATE 40 MG/ML
2 INJECTION, SOLUTION INTRAVENOUS ONCE
Qty: 50 ML | Refills: 0 | Status: COMPLETED | OUTPATIENT
Start: 2023-01-01 | End: 2023-01-01

## 2023-01-01 RX ORDER — DEXTROSE MONOHYDRATE, SODIUM CHLORIDE, AND POTASSIUM CHLORIDE 50; 1.49; 4.5 G/1000ML; G/1000ML; G/1000ML
INJECTION, SOLUTION INTRAVENOUS CONTINUOUS
Status: DISCONTINUED | OUTPATIENT
Start: 2023-01-01 | End: 2023-01-01

## 2023-01-01 RX ORDER — CYCLOBENZAPRINE HCL 5 MG
5 TABLET ORAL
Qty: 30 TABLET | Refills: 0 | Status: SHIPPED | OUTPATIENT
Start: 2023-01-01

## 2023-01-01 RX ORDER — GABAPENTIN 100 MG/1
100 CAPSULE ORAL
Status: CANCELLED | OUTPATIENT
Start: 2023-01-01

## 2023-01-01 RX ORDER — OXYCODONE AND ACETAMINOPHEN 5; 325 MG/1; MG/1
1 TABLET ORAL
Status: DISCONTINUED | OUTPATIENT
Start: 2023-01-01 | End: 2023-01-01 | Stop reason: HOSPADM

## 2023-01-01 RX ORDER — ONDANSETRON 2 MG/ML
INJECTION INTRAMUSCULAR; INTRAVENOUS PRN
Status: DISCONTINUED | OUTPATIENT
Start: 2023-01-01 | End: 2023-01-01

## 2023-01-01 RX ORDER — IBUPROFEN 400 MG/1
400 TABLET, FILM COATED ORAL ONCE
Status: COMPLETED | OUTPATIENT
Start: 2023-01-01 | End: 2023-01-01

## 2023-01-01 RX ORDER — BUDESONIDE AND FORMOTEROL FUMARATE DIHYDRATE 160; 4.5 UG/1; UG/1
2 AEROSOL RESPIRATORY (INHALATION) 2 TIMES DAILY
COMMUNITY

## 2023-01-01 RX ORDER — ACETAMINOPHEN 325 MG/1
975 TABLET ORAL ONCE
Status: COMPLETED | OUTPATIENT
Start: 2023-01-01 | End: 2023-01-01

## 2023-01-01 RX ORDER — HYDROMORPHONE HYDROCHLORIDE 1 MG/ML
0.5 INJECTION, SOLUTION INTRAMUSCULAR; INTRAVENOUS; SUBCUTANEOUS ONCE
Status: COMPLETED | OUTPATIENT
Start: 2023-01-01 | End: 2023-01-01

## 2023-01-01 RX ORDER — DEXTROSE MONOHYDRATE 100 MG/ML
INJECTION, SOLUTION INTRAVENOUS CONTINUOUS PRN
Status: DISCONTINUED | OUTPATIENT
Start: 2023-01-01 | End: 2023-01-01 | Stop reason: HOSPADM

## 2023-01-01 RX ORDER — IOPAMIDOL 755 MG/ML
100 INJECTION, SOLUTION INTRAVASCULAR ONCE
Status: COMPLETED | OUTPATIENT
Start: 2023-01-01 | End: 2023-01-01

## 2023-01-01 RX ORDER — CEFAZOLIN SODIUM/WATER 2 G/20 ML
2 SYRINGE (ML) INTRAVENOUS
Status: COMPLETED | OUTPATIENT
Start: 2023-01-01 | End: 2023-01-01

## 2023-01-01 RX ORDER — FENTANYL CITRATE 50 UG/ML
25-50 INJECTION, SOLUTION INTRAMUSCULAR; INTRAVENOUS EVERY 5 MIN PRN
Status: DISCONTINUED | OUTPATIENT
Start: 2023-01-01 | End: 2023-01-01 | Stop reason: HOSPADM

## 2023-01-01 RX ORDER — LIDOCAINE HYDROCHLORIDE 20 MG/ML
INJECTION, SOLUTION INFILTRATION; PERINEURAL PRN
Status: DISCONTINUED | OUTPATIENT
Start: 2023-01-01 | End: 2023-01-01

## 2023-01-01 RX ORDER — OXYCODONE HYDROCHLORIDE 5 MG/1
5 TABLET ORAL EVERY 6 HOURS PRN
Qty: 10 TABLET | Refills: 0 | Status: SHIPPED | OUTPATIENT
Start: 2023-01-01 | End: 2023-01-01

## 2023-01-01 RX ORDER — AMOXICILLIN 250 MG
1 CAPSULE ORAL 2 TIMES DAILY
Qty: 14 TABLET | Refills: 0 | Status: SHIPPED | OUTPATIENT
Start: 2023-01-01

## 2023-01-01 RX ORDER — ACETAMINOPHEN 500 MG
10000 TABLET ORAL EVERY 6 HOURS PRN
Status: ON HOLD | COMMUNITY
End: 2023-01-01

## 2023-01-01 RX ORDER — BUPIVACAINE HYDROCHLORIDE AND EPINEPHRINE 2.5; 5 MG/ML; UG/ML
INJECTION, SOLUTION INFILTRATION; PERINEURAL PRN
Status: DISCONTINUED | OUTPATIENT
Start: 2023-01-01 | End: 2023-01-01 | Stop reason: HOSPADM

## 2023-01-01 RX ORDER — ACETAMINOPHEN 325 MG/1
975 TABLET ORAL ONCE
Status: DISCONTINUED | OUTPATIENT
Start: 2023-01-01 | End: 2023-01-01 | Stop reason: HOSPADM

## 2023-01-01 RX ORDER — METHOCARBAMOL 500 MG/1
500 TABLET, FILM COATED ORAL EVERY 6 HOURS PRN
Status: DISCONTINUED | OUTPATIENT
Start: 2023-01-01 | End: 2023-01-01 | Stop reason: HOSPADM

## 2023-01-01 RX ORDER — PROPOFOL 10 MG/ML
10 INJECTION, EMULSION INTRAVENOUS CONTINUOUS
Status: DISCONTINUED | OUTPATIENT
Start: 2023-01-01 | End: 2023-01-01

## 2023-01-01 RX ORDER — PROCHLORPERAZINE 25 MG
12.5 SUPPOSITORY, RECTAL RECTAL EVERY 12 HOURS PRN
Status: DISCONTINUED | OUTPATIENT
Start: 2023-01-01 | End: 2023-01-01 | Stop reason: HOSPADM

## 2023-01-01 RX ORDER — GUAIFENESIN 600 MG/1
15 TABLET, EXTENDED RELEASE ORAL DAILY
Status: DISCONTINUED | OUTPATIENT
Start: 2023-01-01 | End: 2023-01-01

## 2023-01-01 RX ORDER — HYDROMORPHONE HCL IN WATER/PF 6 MG/30 ML
0.4 PATIENT CONTROLLED ANALGESIA SYRINGE INTRAVENOUS
Status: DISCONTINUED | OUTPATIENT
Start: 2023-01-01 | End: 2023-01-01 | Stop reason: HOSPADM

## 2023-01-01 RX ORDER — ACETAMINOPHEN 325 MG/1
650 TABLET ORAL EVERY 4 HOURS PRN
Status: DISCONTINUED | OUTPATIENT
Start: 2023-01-01 | End: 2023-01-01

## 2023-01-01 RX ORDER — LEVOTHYROXINE SODIUM 112 UG/1
112 TABLET ORAL
Status: DISCONTINUED | OUTPATIENT
Start: 2023-01-01 | End: 2023-01-01 | Stop reason: HOSPADM

## 2023-01-01 RX ORDER — ALBUTEROL SULFATE 90 UG/1
1-2 AEROSOL, METERED RESPIRATORY (INHALATION) EVERY 6 HOURS PRN
Status: DISCONTINUED | OUTPATIENT
Start: 2023-01-01 | End: 2023-01-01 | Stop reason: HOSPADM

## 2023-01-01 RX ADMIN — ACETAMINOPHEN 975 MG: 325 TABLET, FILM COATED ORAL at 20:55

## 2023-01-01 RX ADMIN — Medication 2 G: at 16:54

## 2023-01-01 RX ADMIN — IOPAMIDOL 100 ML: 755 INJECTION, SOLUTION INTRAVENOUS at 12:33

## 2023-01-01 RX ADMIN — SENNOSIDES AND DOCUSATE SODIUM 1 TABLET: 50; 8.6 TABLET ORAL at 07:24

## 2023-01-01 RX ADMIN — IOPAMIDOL 75 ML: 755 INJECTION, SOLUTION INTRAVENOUS at 11:00

## 2023-01-01 RX ADMIN — FENTANYL CITRATE 50 MCG: 50 INJECTION, SOLUTION INTRAMUSCULAR; INTRAVENOUS at 08:53

## 2023-01-01 RX ADMIN — FENTANYL CITRATE 50 MCG: 50 INJECTION INTRAMUSCULAR; INTRAVENOUS at 23:09

## 2023-01-01 RX ADMIN — MIDAZOLAM HYDROCHLORIDE 2 MG: 1 INJECTION, SOLUTION INTRAMUSCULAR; INTRAVENOUS at 17:27

## 2023-01-01 RX ADMIN — SODIUM CHLORIDE 5 UNITS: 9 INJECTION, SOLUTION INTRAVENOUS at 19:24

## 2023-01-01 RX ADMIN — METHOCARBAMOL 500 MG: 500 TABLET ORAL at 20:20

## 2023-01-01 RX ADMIN — ACETAMINOPHEN 650 MG: 325 TABLET, FILM COATED ORAL at 05:14

## 2023-01-01 RX ADMIN — MIDAZOLAM HYDROCHLORIDE 2 MG: 1 INJECTION, SOLUTION INTRAMUSCULAR; INTRAVENOUS at 14:41

## 2023-01-01 RX ADMIN — PROPOFOL 150 MCG/KG/MIN: 10 INJECTION, EMULSION INTRAVENOUS at 10:16

## 2023-01-01 RX ADMIN — SODIUM CHLORIDE, POTASSIUM CHLORIDE, SODIUM LACTATE AND CALCIUM CHLORIDE: 600; 310; 30; 20 INJECTION, SOLUTION INTRAVENOUS at 12:04

## 2023-01-01 RX ADMIN — SUCCINYLCHOLINE CHLORIDE 140 MG: 20 INJECTION, SOLUTION INTRAMUSCULAR; INTRAVENOUS; PARENTERAL at 12:11

## 2023-01-01 RX ADMIN — PHENYLEPHRINE HYDROCHLORIDE 100 MCG: 10 INJECTION INTRAVENOUS at 10:44

## 2023-01-01 RX ADMIN — FENTANYL CITRATE 50 MCG: 50 INJECTION INTRAMUSCULAR; INTRAVENOUS at 03:21

## 2023-01-01 RX ADMIN — GABAPENTIN 100 MG: 100 CAPSULE ORAL at 14:20

## 2023-01-01 RX ADMIN — HYDROCODONE BITARTRATE AND ACETAMINOPHEN 2 TABLET: 5; 325 TABLET ORAL at 02:40

## 2023-01-01 RX ADMIN — ONDANSETRON 4 MG: 2 INJECTION INTRAMUSCULAR; INTRAVENOUS at 14:47

## 2023-01-01 RX ADMIN — ONDANSETRON 4 MG: 2 INJECTION INTRAMUSCULAR; INTRAVENOUS at 11:29

## 2023-01-01 RX ADMIN — HYDROCODONE BITARTRATE AND ACETAMINOPHEN 2 TABLET: 5; 325 TABLET ORAL at 10:52

## 2023-01-01 RX ADMIN — FENTANYL CITRATE 50 MCG: 50 INJECTION INTRAMUSCULAR; INTRAVENOUS at 23:38

## 2023-01-01 RX ADMIN — ACETAMINOPHEN 975 MG: 325 TABLET, FILM COATED ORAL at 11:41

## 2023-01-01 RX ADMIN — MIDAZOLAM HYDROCHLORIDE 2 MG: 1 INJECTION, SOLUTION INTRAMUSCULAR; INTRAVENOUS at 06:52

## 2023-01-01 RX ADMIN — LABETALOL HYDROCHLORIDE 10 MG: 5 INJECTION, SOLUTION INTRAVENOUS at 08:34

## 2023-01-01 RX ADMIN — FENTANYL CITRATE 50 MCG: 50 INJECTION INTRAMUSCULAR; INTRAVENOUS at 01:29

## 2023-01-01 RX ADMIN — EPHEDRINE SULFATE 5 MG: 5 INJECTION INTRAVENOUS at 15:09

## 2023-01-01 RX ADMIN — MIDAZOLAM HYDROCHLORIDE 2 MG: 1 INJECTION, SOLUTION INTRAMUSCULAR; INTRAVENOUS at 17:46

## 2023-01-01 RX ADMIN — Medication 10 MG: at 16:11

## 2023-01-01 RX ADMIN — MAGNESIUM SULFATE IN WATER 2 G: 40 INJECTION, SOLUTION INTRAVENOUS at 08:39

## 2023-01-01 RX ADMIN — MIDAZOLAM HYDROCHLORIDE 2 MG: 1 INJECTION, SOLUTION INTRAMUSCULAR; INTRAVENOUS at 00:08

## 2023-01-01 RX ADMIN — FENTANYL CITRATE 50 MCG: 50 INJECTION INTRAMUSCULAR; INTRAVENOUS at 00:43

## 2023-01-01 RX ADMIN — FENTANYL CITRATE 25 MCG: 50 INJECTION, SOLUTION INTRAMUSCULAR; INTRAVENOUS at 19:02

## 2023-01-01 RX ADMIN — LIDOCAINE 1 PATCH: 4 PATCH TOPICAL at 23:36

## 2023-01-01 RX ADMIN — FENTANYL CITRATE 50 MCG: 50 INJECTION, SOLUTION INTRAMUSCULAR; INTRAVENOUS at 10:16

## 2023-01-01 RX ADMIN — MIDAZOLAM HYDROCHLORIDE 2 MG: 1 INJECTION, SOLUTION INTRAMUSCULAR; INTRAVENOUS at 04:49

## 2023-01-01 RX ADMIN — GABAPENTIN 100 MG: 100 CAPSULE ORAL at 11:42

## 2023-01-01 RX ADMIN — IBUPROFEN 400 MG: 400 TABLET ORAL at 23:36

## 2023-01-01 RX ADMIN — LIDOCAINE HYDROCHLORIDE 100 MG: 20 INJECTION, SOLUTION INFILTRATION; PERINEURAL at 12:11

## 2023-01-01 RX ADMIN — NICARDIPINE HYDROCHLORIDE 2.5 MG/HR: 0.2 INJECTION, SOLUTION INTRAVENOUS at 09:45

## 2023-01-01 RX ADMIN — DEXAMETHASONE SODIUM PHOSPHATE 10 MG: 10 INJECTION, SOLUTION INTRAMUSCULAR; INTRAVENOUS at 10:15

## 2023-01-01 RX ADMIN — FENTANYL CITRATE 50 MCG: 50 INJECTION INTRAMUSCULAR; INTRAVENOUS at 12:57

## 2023-01-01 RX ADMIN — OXYCODONE HYDROCHLORIDE 5 MG: 5 TABLET ORAL at 09:53

## 2023-01-01 RX ADMIN — FENTANYL CITRATE 50 MCG: 50 INJECTION INTRAMUSCULAR; INTRAVENOUS at 02:14

## 2023-01-01 RX ADMIN — EPHEDRINE SULFATE 10 MG: 5 INJECTION INTRAVENOUS at 17:39

## 2023-01-01 RX ADMIN — POTASSIUM CHLORIDE 10 MEQ: 7.46 INJECTION, SOLUTION INTRAVENOUS at 08:05

## 2023-01-01 RX ADMIN — MIDAZOLAM HYDROCHLORIDE 2 MG: 1 INJECTION, SOLUTION INTRAMUSCULAR; INTRAVENOUS at 17:37

## 2023-01-01 RX ADMIN — SODIUM BICARBONATE 50 MEQ: 84 INJECTION, SOLUTION INTRAVENOUS at 17:31

## 2023-01-01 RX ADMIN — MIDAZOLAM HYDROCHLORIDE 2 MG: 1 INJECTION, SOLUTION INTRAMUSCULAR; INTRAVENOUS at 10:58

## 2023-01-01 RX ADMIN — Medication 150 MCG/HR: at 08:17

## 2023-01-01 RX ADMIN — PHENYLEPHRINE HYDROCHLORIDE 50 MCG: 10 INJECTION INTRAVENOUS at 17:30

## 2023-01-01 RX ADMIN — MIDAZOLAM HYDROCHLORIDE 2 MG: 1 INJECTION, SOLUTION INTRAMUSCULAR; INTRAVENOUS at 02:14

## 2023-01-01 RX ADMIN — HYDROCODONE BITARTRATE AND ACETAMINOPHEN 2 TABLET: 5; 325 TABLET ORAL at 17:37

## 2023-01-01 RX ADMIN — HYDROMORPHONE HYDROCHLORIDE 0.4 MG: 0.2 INJECTION, SOLUTION INTRAMUSCULAR; INTRAVENOUS; SUBCUTANEOUS at 22:34

## 2023-01-01 RX ADMIN — DEXAMETHASONE SODIUM PHOSPHATE 8 MG: 4 INJECTION, SOLUTION INTRA-ARTICULAR; INTRALESIONAL; INTRAMUSCULAR; INTRAVENOUS; SOFT TISSUE at 13:09

## 2023-01-01 RX ADMIN — POTASSIUM CHLORIDE 10 MEQ: 7.46 INJECTION, SOLUTION INTRAVENOUS at 06:32

## 2023-01-01 RX ADMIN — POTASSIUM CHLORIDE, DEXTROSE MONOHYDRATE AND SODIUM CHLORIDE: 150; 5; 450 INJECTION, SOLUTION INTRAVENOUS at 09:56

## 2023-01-01 RX ADMIN — LIDOCAINE HYDROCHLORIDE 30 MG: 10 INJECTION, SOLUTION INFILTRATION; PERINEURAL at 10:15

## 2023-01-01 RX ADMIN — Medication 2 G: at 12:54

## 2023-01-01 RX ADMIN — POTASSIUM CHLORIDE 10 MEQ: 7.46 INJECTION, SOLUTION INTRAVENOUS at 09:04

## 2023-01-01 RX ADMIN — ACETAMINOPHEN 650 MG: 325 TABLET, FILM COATED ORAL at 12:02

## 2023-01-01 RX ADMIN — SUGAMMADEX 200 MG: 100 INJECTION, SOLUTION INTRAVENOUS at 11:29

## 2023-01-01 RX ADMIN — MIDAZOLAM HYDROCHLORIDE 2 MG: 1 INJECTION, SOLUTION INTRAMUSCULAR; INTRAVENOUS at 19:40

## 2023-01-01 RX ADMIN — SUGAMMADEX 200 MG: 100 INJECTION, SOLUTION INTRAVENOUS at 17:59

## 2023-01-01 RX ADMIN — SENNOSIDES AND DOCUSATE SODIUM 1 TABLET: 50; 8.6 TABLET ORAL at 20:55

## 2023-01-01 RX ADMIN — Medication 50 MG: at 12:23

## 2023-01-01 RX ADMIN — PROPOFOL 150 MG: 10 INJECTION, EMULSION INTRAVENOUS at 10:16

## 2023-01-01 RX ADMIN — ENOXAPARIN SODIUM 40 MG: 40 INJECTION SUBCUTANEOUS at 11:52

## 2023-01-01 RX ADMIN — ACETAMINOPHEN 650 MG: 325 TABLET, FILM COATED ORAL at 11:52

## 2023-01-01 RX ADMIN — PANTOPRAZOLE SODIUM 40 MG: 40 TABLET, DELAYED RELEASE ORAL at 07:24

## 2023-01-01 RX ADMIN — PHENYLEPHRINE HYDROCHLORIDE 50 MCG: 10 INJECTION INTRAVENOUS at 17:12

## 2023-01-01 RX ADMIN — CHLORHEXIDINE GLUCONATE 15 ML: 1.2 SOLUTION ORAL at 08:00

## 2023-01-01 RX ADMIN — MIDAZOLAM HYDROCHLORIDE 2 MG: 1 INJECTION, SOLUTION INTRAMUSCULAR; INTRAVENOUS at 00:37

## 2023-01-01 RX ADMIN — PANTOPRAZOLE SODIUM 40 MG: 40 INJECTION, POWDER, FOR SOLUTION INTRAVENOUS at 07:46

## 2023-01-01 RX ADMIN — FENTANYL CITRATE 50 MCG: 50 INJECTION INTRAMUSCULAR; INTRAVENOUS at 00:07

## 2023-01-01 RX ADMIN — MIDAZOLAM HYDROCHLORIDE 2 MG: 1 INJECTION, SOLUTION INTRAMUSCULAR; INTRAVENOUS at 22:14

## 2023-01-01 RX ADMIN — LATANOPROST 1 DROP: 50 SOLUTION OPHTHALMIC at 22:21

## 2023-01-01 RX ADMIN — FENTANYL CITRATE 50 MCG: 50 INJECTION INTRAMUSCULAR; INTRAVENOUS at 19:44

## 2023-01-01 RX ADMIN — LEVOTHYROXINE SODIUM 112 MCG: 0.11 TABLET ORAL at 07:47

## 2023-01-01 RX ADMIN — MIDAZOLAM HYDROCHLORIDE 2 MG: 1 INJECTION, SOLUTION INTRAMUSCULAR; INTRAVENOUS at 20:42

## 2023-01-01 RX ADMIN — MIDAZOLAM HYDROCHLORIDE 2 MG: 1 INJECTION, SOLUTION INTRAMUSCULAR; INTRAVENOUS at 13:00

## 2023-01-01 RX ADMIN — HYDROMORPHONE HYDROCHLORIDE 0.4 MG: 0.2 INJECTION, SOLUTION INTRAMUSCULAR; INTRAVENOUS; SUBCUTANEOUS at 19:15

## 2023-01-01 RX ADMIN — GABAPENTIN 100 MG: 100 CAPSULE ORAL at 07:46

## 2023-01-01 RX ADMIN — EPHEDRINE SULFATE 5 MG: 5 INJECTION INTRAVENOUS at 13:50

## 2023-01-01 RX ADMIN — MIDAZOLAM HYDROCHLORIDE 2 MG: 1 INJECTION, SOLUTION INTRAMUSCULAR; INTRAVENOUS at 05:37

## 2023-01-01 RX ADMIN — HYDROMORPHONE HYDROCHLORIDE 0.5 MG: 1 INJECTION, SOLUTION INTRAMUSCULAR; INTRAVENOUS; SUBCUTANEOUS at 14:51

## 2023-01-01 RX ADMIN — FENTANYL CITRATE 50 MCG: 50 INJECTION INTRAMUSCULAR; INTRAVENOUS at 15:33

## 2023-01-01 RX ADMIN — POTASSIUM CHLORIDE 10 MEQ: 7.46 INJECTION, SOLUTION INTRAVENOUS at 04:55

## 2023-01-01 RX ADMIN — MIDAZOLAM HYDROCHLORIDE 2 MG: 1 INJECTION, SOLUTION INTRAMUSCULAR; INTRAVENOUS at 04:14

## 2023-01-01 RX ADMIN — FENTANYL CITRATE 50 MCG: 50 INJECTION INTRAMUSCULAR; INTRAVENOUS at 21:33

## 2023-01-01 RX ADMIN — Medication 20 MG: at 15:01

## 2023-01-01 RX ADMIN — LEVOTHYROXINE SODIUM 112 MCG: 0.11 TABLET ORAL at 07:24

## 2023-01-01 RX ADMIN — MIDAZOLAM HYDROCHLORIDE 2 MG: 1 INJECTION, SOLUTION INTRAMUSCULAR; INTRAVENOUS at 21:33

## 2023-01-01 RX ADMIN — LABETALOL HYDROCHLORIDE 10 MG: 5 INJECTION, SOLUTION INTRAVENOUS at 09:01

## 2023-01-01 RX ADMIN — ENOXAPARIN SODIUM 40 MG: 40 INJECTION SUBCUTANEOUS at 11:42

## 2023-01-01 RX ADMIN — Medication 50 MCG/HR: at 17:03

## 2023-01-01 RX ADMIN — FENTANYL CITRATE 50 MCG: 50 INJECTION INTRAMUSCULAR; INTRAVENOUS at 04:50

## 2023-01-01 RX ADMIN — MIDAZOLAM HYDROCHLORIDE 2 MG: 1 INJECTION, SOLUTION INTRAMUSCULAR; INTRAVENOUS at 01:28

## 2023-01-01 RX ADMIN — EPHEDRINE SULFATE 10 MG: 5 INJECTION INTRAVENOUS at 16:11

## 2023-01-01 RX ADMIN — CYCLOBENZAPRINE 10 MG: 10 TABLET, FILM COATED ORAL at 09:53

## 2023-01-01 RX ADMIN — Medication 20 MG: at 13:48

## 2023-01-01 RX ADMIN — SODIUM CHLORIDE, POTASSIUM CHLORIDE, SODIUM LACTATE AND CALCIUM CHLORIDE: 600; 310; 30; 20 INJECTION, SOLUTION INTRAVENOUS at 09:12

## 2023-01-01 RX ADMIN — MIDAZOLAM HYDROCHLORIDE 2 MG: 1 INJECTION, SOLUTION INTRAMUSCULAR; INTRAVENOUS at 08:17

## 2023-01-01 RX ADMIN — ROCURONIUM BROMIDE 50 MG: 50 INJECTION, SOLUTION INTRAVENOUS at 10:17

## 2023-01-01 RX ADMIN — POLYETHYLENE GLYCOL 3350 17 G: 17 POWDER, FOR SOLUTION ORAL at 07:24

## 2023-01-01 RX ADMIN — FENTANYL CITRATE 50 MCG: 50 INJECTION INTRAMUSCULAR; INTRAVENOUS at 18:00

## 2023-01-01 RX ADMIN — ONDANSETRON 4 MG: 2 INJECTION INTRAMUSCULAR; INTRAVENOUS at 17:43

## 2023-01-01 RX ADMIN — FENTANYL CITRATE 50 MCG: 50 INJECTION INTRAMUSCULAR; INTRAVENOUS at 22:14

## 2023-01-01 RX ADMIN — GABAPENTIN 100 MG: 100 CAPSULE ORAL at 20:17

## 2023-01-01 RX ADMIN — TIMOLOL MALEATE 1 DROP: 5 SOLUTION/ DROPS OPHTHALMIC at 07:22

## 2023-01-01 RX ADMIN — SODIUM CHLORIDE, POTASSIUM CHLORIDE, SODIUM LACTATE AND CALCIUM CHLORIDE: 600; 310; 30; 20 INJECTION, SOLUTION INTRAVENOUS at 10:04

## 2023-01-01 RX ADMIN — PIPERACILLIN AND TAZOBACTAM 3.38 G: 3; .375 INJECTION, POWDER, LYOPHILIZED, FOR SOLUTION INTRAVENOUS at 07:59

## 2023-01-01 RX ADMIN — SENNOSIDES AND DOCUSATE SODIUM 1 TABLET: 50; 8.6 TABLET ORAL at 07:46

## 2023-01-01 RX ADMIN — FENTANYL CITRATE 50 MCG: 50 INJECTION INTRAMUSCULAR; INTRAVENOUS at 04:14

## 2023-01-01 RX ADMIN — FENTANYL CITRATE 50 MCG: 50 INJECTION INTRAMUSCULAR; INTRAVENOUS at 17:30

## 2023-01-01 RX ADMIN — LIDOCAINE HYDROCHLORIDE 50 MG: 10 INJECTION, SOLUTION EPIDURAL; INFILTRATION; INTRACAUDAL; PERINEURAL at 11:37

## 2023-01-01 RX ADMIN — SENNOSIDES AND DOCUSATE SODIUM 1 TABLET: 50; 8.6 TABLET ORAL at 20:17

## 2023-01-01 RX ADMIN — HYDROMORPHONE HYDROCHLORIDE 0.4 MG: 0.2 INJECTION, SOLUTION INTRAMUSCULAR; INTRAVENOUS; SUBCUTANEOUS at 19:46

## 2023-01-01 RX ADMIN — GABAPENTIN 100 MG: 100 CAPSULE ORAL at 20:55

## 2023-01-01 RX ADMIN — ENOXAPARIN SODIUM 40 MG: 40 INJECTION SUBCUTANEOUS at 12:02

## 2023-01-01 RX ADMIN — MIDAZOLAM HYDROCHLORIDE 2 MG: 1 INJECTION, SOLUTION INTRAMUSCULAR; INTRAVENOUS at 22:38

## 2023-01-01 RX ADMIN — MAGNESIUM HYDROXIDE 30 ML: 1200 LIQUID ORAL at 05:18

## 2023-01-01 RX ADMIN — MIDAZOLAM 1 MG: 1 INJECTION INTRAMUSCULAR; INTRAVENOUS at 12:03

## 2023-01-01 RX ADMIN — Medication 10 MG: at 17:07

## 2023-01-01 RX ADMIN — OXYCODONE HYDROCHLORIDE 5 MG: 5 TABLET ORAL at 23:36

## 2023-01-01 RX ADMIN — CHLORHEXIDINE GLUCONATE 0.12% ORAL RINSE 15 ML: 1.2 LIQUID ORAL at 11:42

## 2023-01-01 RX ADMIN — FENTANYL CITRATE 250 MCG: 50 INJECTION INTRAMUSCULAR; INTRAVENOUS at 12:11

## 2023-01-01 RX ADMIN — PHENYLEPHRINE HYDROCHLORIDE 100 MCG: 10 INJECTION INTRAVENOUS at 16:52

## 2023-01-01 RX ADMIN — MIDAZOLAM 1 MG: 1 INJECTION INTRAMUSCULAR; INTRAVENOUS at 08:54

## 2023-01-01 RX ADMIN — METHOCARBAMOL 500 MG: 500 TABLET ORAL at 22:35

## 2023-01-01 RX ADMIN — FENTANYL CITRATE 50 MCG: 50 INJECTION INTRAMUSCULAR; INTRAVENOUS at 06:52

## 2023-01-01 RX ADMIN — GLYCOPYRROLATE 0.2 MG: 0.2 INJECTION INTRAMUSCULAR; INTRAVENOUS at 17:28

## 2023-01-01 RX ADMIN — POTASSIUM CHLORIDE, DEXTROSE MONOHYDRATE AND SODIUM CHLORIDE: 150; 5; 450 INJECTION, SOLUTION INTRAVENOUS at 21:12

## 2023-01-01 RX ADMIN — TIMOLOL MALEATE 1 DROP: 5 SOLUTION/ DROPS OPHTHALMIC at 20:17

## 2023-01-01 RX ADMIN — MIDAZOLAM HYDROCHLORIDE 2 MG: 1 INJECTION, SOLUTION INTRAMUSCULAR; INTRAVENOUS at 18:12

## 2023-01-01 RX ADMIN — MIDAZOLAM HYDROCHLORIDE 2 MG: 1 INJECTION, SOLUTION INTRAMUSCULAR; INTRAVENOUS at 23:09

## 2023-01-01 RX ADMIN — FENTANYL CITRATE 50 MCG: 50 INJECTION INTRAMUSCULAR; INTRAVENOUS at 05:20

## 2023-01-01 RX ADMIN — ONDANSETRON 4 MG: 4 TABLET, ORALLY DISINTEGRATING ORAL at 23:36

## 2023-01-01 RX ADMIN — EPHEDRINE SULFATE 10 MG: 5 INJECTION INTRAVENOUS at 17:12

## 2023-01-01 RX ADMIN — PANTOPRAZOLE SODIUM 40 MG: 40 INJECTION, POWDER, FOR SOLUTION INTRAVENOUS at 07:59

## 2023-01-01 RX ADMIN — MIDAZOLAM HYDROCHLORIDE 2 MG: 1 INJECTION, SOLUTION INTRAMUSCULAR; INTRAVENOUS at 23:38

## 2023-01-01 RX ADMIN — EPHEDRINE SULFATE 5 MG: 5 INJECTION INTRAVENOUS at 13:51

## 2023-01-01 RX ADMIN — OXYCODONE HYDROCHLORIDE 5 MG: 5 TABLET ORAL at 10:43

## 2023-01-01 RX ADMIN — HYDROCODONE BITARTRATE AND ACETAMINOPHEN 2 TABLET: 5; 325 TABLET ORAL at 22:21

## 2023-01-01 RX ADMIN — POLYETHYLENE GLYCOL 3350 17 G: 17 POWDER, FOR SOLUTION ORAL at 10:43

## 2023-01-01 RX ADMIN — Medication 50 MCG/HR: at 17:57

## 2023-01-01 RX ADMIN — HYDROMORPHONE HYDROCHLORIDE 0.4 MG: 0.2 INJECTION, SOLUTION INTRAMUSCULAR; INTRAVENOUS; SUBCUTANEOUS at 20:51

## 2023-01-01 RX ADMIN — POTASSIUM PHOSPHATE, MONOBASIC POTASSIUM PHOSPHATE, DIBASIC 15 MMOL: 224; 236 INJECTION, SOLUTION, CONCENTRATE INTRAVENOUS at 10:11

## 2023-01-01 RX ADMIN — MIDAZOLAM HYDROCHLORIDE 2 MG: 1 INJECTION, SOLUTION INTRAMUSCULAR; INTRAVENOUS at 05:18

## 2023-01-01 RX ADMIN — FENTANYL CITRATE 50 MCG: 50 INJECTION, SOLUTION INTRAMUSCULAR; INTRAVENOUS at 18:54

## 2023-01-01 RX ADMIN — Medication 20 MG: at 13:21

## 2023-01-01 RX ADMIN — LEVETIRACETAM 1000 MG: 10 INJECTION INTRAVENOUS at 04:50

## 2023-01-01 RX ADMIN — EPHEDRINE SULFATE 5 MG: 5 INJECTION INTRAVENOUS at 16:33

## 2023-01-01 RX ADMIN — PROPOFOL 30 MCG/KG/MIN: 10 INJECTION, EMULSION INTRAVENOUS at 03:54

## 2023-01-01 RX ADMIN — GABAPENTIN 100 MG: 100 CAPSULE ORAL at 07:24

## 2023-01-01 RX ADMIN — PHENYLEPHRINE HYDROCHLORIDE 100 MCG: 10 INJECTION INTRAVENOUS at 13:00

## 2023-01-01 RX ADMIN — PROPOFOL 120 MG: 10 INJECTION, EMULSION INTRAVENOUS at 12:11

## 2023-01-01 RX ADMIN — LIDOCAINE 1 PATCH: 4 PATCH TOPICAL at 11:19

## 2023-01-01 RX ADMIN — POLYETHYLENE GLYCOL 3350 17 G: 17 POWDER, FOR SOLUTION ORAL at 07:47

## 2023-01-01 ASSESSMENT — ACTIVITIES OF DAILY LIVING (ADL)
ADLS_ACUITY_SCORE: 51
ADLS_ACUITY_SCORE: 33
PREVIOUS_RESPONSIBILITIES: MEAL PREP;HOUSEKEEPING;LAUNDRY;SHOPPING;YARDWORK;MEDICATION MANAGEMENT;FINANCES;DRIVING
ADLS_ACUITY_SCORE: 22
ADLS_ACUITY_SCORE: 20
ADLS_ACUITY_SCORE: 51
ADLS_ACUITY_SCORE: 35
ADLS_ACUITY_SCORE: 51
ADLS_ACUITY_SCORE: 20
ADLS_ACUITY_SCORE: 22
ADLS_ACUITY_SCORE: 51
ADLS_ACUITY_SCORE: 35
ADLS_ACUITY_SCORE: 51
ADLS_ACUITY_SCORE: 22
ADLS_ACUITY_SCORE: 35
ADLS_ACUITY_SCORE: 51
ADLS_ACUITY_SCORE: 22
ADLS_ACUITY_SCORE: 20
ADLS_ACUITY_SCORE: 51
IADL_COMMENTS: PT AND SPOUSE SHARE IADL RESPONSIBILITIES
ADLS_ACUITY_SCORE: 22
ADLS_ACUITY_SCORE: 35
ADLS_ACUITY_SCORE: 51
ADLS_ACUITY_SCORE: 22
ADLS_ACUITY_SCORE: 51
ADLS_ACUITY_SCORE: 51
ADLS_ACUITY_SCORE: 22
ADLS_ACUITY_SCORE: 51
ADLS_ACUITY_SCORE: 22
ADLS_ACUITY_SCORE: 51
ADLS_ACUITY_SCORE: 51
ADLS_ACUITY_SCORE: 35
ADLS_ACUITY_SCORE: 22
ADLS_ACUITY_SCORE: 35
ADLS_ACUITY_SCORE: 22
ADLS_ACUITY_SCORE: 22
ADLS_ACUITY_SCORE: 20
ADLS_ACUITY_SCORE: 51
ADLS_ACUITY_SCORE: 51
ADLS_ACUITY_SCORE: 22
ADLS_ACUITY_SCORE: 20
ADLS_ACUITY_SCORE: 51
ADLS_ACUITY_SCORE: 22
ADLS_ACUITY_SCORE: 20
ADLS_ACUITY_SCORE: 18
ADLS_ACUITY_SCORE: 35
ADLS_ACUITY_SCORE: 22
ADLS_ACUITY_SCORE: 20
ADLS_ACUITY_SCORE: 51
ADLS_ACUITY_SCORE: 51

## 2023-01-01 ASSESSMENT — PAIN SCALES - GENERAL
PAINLEVEL: SEVERE PAIN (6)
PAINLEVEL: MODERATE PAIN (4)

## 2023-01-01 ASSESSMENT — ENCOUNTER SYMPTOMS
ABDOMINAL PAIN: 1
SHORTNESS OF BREATH: 0
CHILLS: 0
HEMATURIA: 0
VOMITING: 0
DYSRHYTHMIAS: 0
VOICE CHANGE: 0
NUMBNESS: 0
WEAKNESS: 0
NAUSEA: 1
TROUBLE SWALLOWING: 0
SORE THROAT: 0
DYSURIA: 0
RHINORRHEA: 0
FLANK PAIN: 1
BACK PAIN: 0
DIARRHEA: 0
FEVER: 0
BLOOD IN STOOL: 0
COUGH: 1
CONSTIPATION: 1
SEIZURES: 0

## 2023-01-01 ASSESSMENT — VISUAL ACUITY
OU: OTHER (SEE COMMENT)
OU: OTHER (SEE COMMENT)

## 2023-01-01 ASSESSMENT — LIFESTYLE VARIABLES: TOBACCO_USE: 0

## 2023-09-07 NOTE — PROGRESS NOTES
"THORACIC SURGERY - NEW PATIENT OFFICE VISIT      Dear Dr. Tinoco,    I saw Linda J Mrozinski at Dr. Zaldivar's request in consultation for the evaluation and treatment of a mass of the RIGHT lower lobe.    HPI  Ms. Linda J Mrozinski is a 73 year old female patient who presents with an incidentally found lung mass, which has increased in size.            ECOG performance status  1- Mild physical restriction, sedentary                 Previsit Tests   PFT pending  CT scan (7/20/2023): RIGHT lower lobe 4.6 cm pulmonary mass, without mediastinal adenopathy, with no pleural effusion. The mass mas 4.4 cm on 1/26/2023.    PET scan (8/16/2023, incomplete images): Max SUV 4.6. No suspicious hypermetabolic activity elsewhere  Brain MRI pending  Covid vaccination status: Vaccinated    PMH  Reviewed, as below    Glaucoma  Reactive airway disease  Obesity    PSH  Reviewed, as below    Past Surgical History:   Procedure Laterality Date    JOINT REPLACEMENT Right 2011    right knee    LAPAROSCOPIC CHOLECYSTECTOMY N/A 3/18/2015    Procedure: CHOLECYSTECTOMY LAPAROSCOPIC;  Surgeon: Hussain Langford MD;  Location: [facility];  Service:     TUBAL LIGATION      Cataract surgery    Allergies   Allergen Reactions    Indomethacin Headache    Penicillins Hives     Pt reports she has tolerated amoxicillin in the past (e.g. prior to a dental procedure in April 2015)    Amoxicillin \"ok\"    Piroxicam Nausea       Current Outpatient Medications   Medication    albuterol (PROVENTIL HFA;VENTOLIN HFA) 90 mcg/actuation inhaler    Budesonide-Formoterol Fumarate (SYMBICORT IN)    cholecalciferol, vitamin D3, 1,000 unit tablet    latanoprost (XALATAN) 0.005 % ophthalmic solution    levothyroxine (SYNTHROID, LEVOTHROID) 112 MCG tablet    timolol maleate (TIMOPTIC) 0.5 % ophthalmic solution    aspirin 81 mg chewable tablet    brinzolamide (AZOPT) 1 % ophthalmic suspension     No current facility-administered medications for this visit. "       ETOH: 4 drinks/week  TOBACCO: never  OTHER DRUGS: occasional marijuana (once every 4-6 months)    Physical examination  BP (!) 161/90 (BP Location: Left arm, Patient Position: Sitting, Cuff Size: Adult Large)   Pulse 64   Wt 103.5 kg (228 lb 3.2 oz)   SpO2 95%   BMI 40.42 kg/m           From a personal perspective, she lives with her , Scott, who has severe arthritis. She is retired, and she has no children.    IMPRESSION   73 year old female patient with RIGHT lower lobe mass.    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 I reviewed the plan as follows:  PFT  Brain CT (she is claustrophobic)  IR needle biopsy  EBUS  Follow-up in clinic with results    I appreciate the opportunity to participate in the care of your patient and will keep you updated.  Sincerely,    Milan Broderick MD

## 2023-09-13 NOTE — TELEPHONE ENCOUNTER
Called patient to go over EBUS education. Informed patient that procedure is scheduled for 9/28 at 1015am at [facility].  Instructed to arrive at 8:45am.  Discussed that an IV will be placed and IV sedation will be given.  Biopsies may be done.  Instructed to have nothing to eat after midnight. Ok to have clear liquids until 6:45am.    Medication instructions: hold medications in the AM  Stop Aspirin, Ibuprofen, NSAIDS, coxibs (ie:celebrex)date:  does not take  Stop blood thinner date: does not take    Patient understands that they will need a ride home and someone to stay with them after the procedure.  Patient had no further questions and is agreeable to procedure.  Phone number given for questions.    Helga Cortez RN  Pulmonary Specialty Procedures  [facility]  [phone]

## 2023-09-15 NOTE — PROGRESS NOTES
Patient Name: Linda J Mrozinski  Medical Record Number: [id number]  Today's Date: 9/15/2023    Procedure: CT right lung biopsy  Proceduralist: Dr. Jefferson    Procedure Start: 0853  Procedure end: 0908  Sedation medications administered: 1 mg midazolam and 50 mcg fentanyl   Sedation time: 15 minutes    Other Notes: Pt arrived to CT room 1 from Pre/post bay 3. Consent reviewed. Pt denies any questions or concerns regarding procedure. Pt positioned prone with left side up and monitored per protocol. Pt tolerated procedure without any noted complications. VSS on RA. Pt transferred back to Pre/post bay 3.    Discharge criteria met. Discharge instructions given and reviewed with patient and spouse. No further questions or concerns. Pt d/c'd home with spouse.

## 2023-09-15 NOTE — PRE-PROCEDURE
GENERAL PRE-PROCEDURE:   Procedure:  Right lower lobe lung mass biopsy  Date/Time:  9/15/2023 8:22 AM    Written consent obtained?: Yes    Risks and benefits: Risks, benefits and alternatives were discussed    Consent given by:  Patient  Patient states understanding of procedure being performed: Yes    Patient's understanding of procedure matches consent: Yes    Procedure consent matches procedure scheduled: Yes    Expected level of sedation:  Moderate  Appropriately NPO:  Yes  Mallampati  :  Grade 1- soft palate, uvula, tonsillar pillars, and posterior pharyngeal wall visible  Lungs:  Lungs clear with good breath sounds bilaterally  Heart:  Normal heart sounds and rate  History & Physical reviewed:  History and physical reviewed and no updates needed  Statement of review:  I have reviewed the lab findings, diagnostic data, medications, and the plan for sedation

## 2023-09-15 NOTE — PROCEDURES
[facility]    Procedure: Imaging Procedure Note    Date/Time: 9/15/2023 9:11 AM    Performed by: Hussain Jefferson MD  Authorized by: Hussain Jefferson MD      UNIVERSAL PROTOCOL   Site Marked: Yes  Prior Images Obtained and Reviewed:  Yes  Required items: Required blood products, implants, devices and special equipment available    Patient identity confirmed:  Verbally with patient  Patient was reevaluated immediately before administering moderate or deep sedation or anesthesia  Confirmation Checklist:  Patient's identity using two indicators, relevant allergies, procedure was appropriate and matched the consent or emergent situation and correct equipment/implants were available  Time out: Immediately prior to the procedure a time out was called    Universal Protocol: the Joint Commission Universal Protocol was followed    Preparation: Patient was prepped and draped in usual sterile fashion      SEDATION  Patient Sedated: Yes    Vital signs: Vital signs monitored during sedation    See dictated procedure note for full details.    PROCEDURE  Describe Procedure: CT guided RLL lung mass biopsy.  5 cores with 20g needle  Patient Tolerance:  Patient tolerated the procedure well with no immediate complications  Length of time physician/provider present for 1:1 monitoring during sedation: 20

## 2023-09-27 NOTE — ED PROVIDER NOTES
"EMERGENCY DEPARTMENT ENCOUNTER      NAME: Linda J Mrozinski  AGE: 73 year old female  YOB: 1950  MRN: [id number]  EVALUATION DATE & TIME: No admission date for patient encounter.    PCP: Griffin Brian    ED PROVIDER: Billy Garcia DO      Chief Complaint   Patient presents with    Flank Pain         FINAL IMPRESSION:  1. Right flank pain    2. Right lower lobe lung mass          ED COURSE & MEDICAL DECISION MAKING:    Pertinent Labs & Imaging studies reviewed. (See chart for details)  73 year old female presents to the Emergency Department for evaluation of right-sided flank pain.  In the past year has been diagnosed with right lung mass that is currently being worked up with recent lung biopsy on 9/15/2023.  Due for bronchoscopy on 9/28/2023.  Having continued right-sided flank pain described as \"deep\".  Reports that this is exactly the same as when she was first diagnosed with this rib pain.  Denies any other symptoms suggesting other etiologies.  No systemic symptoms.  Exam unremarkable other than mild tenderness to palpation over right flank.  No urinary symptoms.  Pain improved with Flexeril and oxycodone.  Discussed with patient that she should continue work-up and believe this is likely secondary to the lung mass, patient in agreement without further work-up and wanted pain control during this ED visit. Believe that is the culprit for her pain.  Will prescribe Flexeril and oxycodone as needed for acute pain and have patient follow-up with her physicians for continued work up.     ED Course as of 09/27/23 1037   Wed Sep 27, 2023   0932 Billy Garcia DO, [facility] Resident met with the patient to gather history and to perform an initial exam.    0931 Billy Garcia DO, [facility] Resident staffed patient with me. I agree with their assessment and plan of management, and I will see the patient.  m my initial exam, and discuss the plan.    0940 I met with the " "patient to gather history and to perform my initial exam. We discussed plans for the ED course, including diagnostic testing and treatment.     1025 Pt feeling much better after medications here. Short Rx for oxycodone/flexeril, has good, close follow up, including bronchoscopy tomorrow. Understands return precautions.       At the conclusion of the encounter I discussed the results of all of the tests and the disposition. The questions were answered. The patient or family acknowledged understanding and was agreeable with the care plan.     MEDICATIONS GIVEN IN THE EMERGENCY:  Medications   cyclobenzaprine (FLEXERIL) tablet 10 mg (10 mg Oral $Given 9/27/23 0953)   oxyCODONE (ROXICODONE) tablet 5 mg (5 mg Oral $Given 9/27/23 0953)       NEW PRESCRIPTIONS STARTED AT TODAY'S ER VISIT  New Prescriptions    OXYCODONE (ROXICODONE) 5 MG TABLET    Take 1 tablet (5 mg) by mouth every 6 hours as needed for severe pain          =================================================================    HPI    Patient information was obtained from: Patient    Use of : N/A     Linda J Mrozinski is a 73 year old female with a pertinent medical history of hepatic steatosis, HTN, hypothyroidism, morbid obesity, who presents for evaluation of back pain.      Patient reports that having approximately 2 days worth of constant right lower back pain characterized as a poking pain. She notes that initially the pain was mild, but then it significantly worsened at approximately 1:30 AM today. She states that she took 2 Tylenol at approximately 2:30 AM today for her symptoms, but she denies it providing any relief. She endorses having similar pain in January 2023 when she was found to have \"a spot\" in her lung that is still currently being evaluated. She denies any known provoking factors of her symptoms, including pressure or deep inhalation, but she notes that standing alleviates her pain. She denies any associated shortness of breath " "or skin color change. She denies any recent swelling, fever, chills, or any other complications at this time.       REVIEW OF SYSTEMS   Review of Systems   See HPI.    PAST MEDICAL HISTORY:  History reviewed. No pertinent past medical history.    PAST SURGICAL HISTORY:  Past Surgical History:   Procedure Laterality Date    EYE SURGERY      JOINT REPLACEMENT Right 01/01/2011    right knee    LAPAROSCOPIC CHOLECYSTECTOMY N/A 03/18/2015    Procedure: CHOLECYSTECTOMY LAPAROSCOPIC;  Surgeon: Hussain Langford MD;  Location: [facility];  Service:     TUBAL LIGATION             CURRENT MEDICATIONS:    cyclobenzaprine (FLEXERIL) 5 MG tablet  oxyCODONE (ROXICODONE) 5 MG tablet  albuterol (PROVENTIL HFA;VENTOLIN HFA) 90 mcg/actuation inhaler  brinzolamide (AZOPT) 1 % ophthalmic suspension  Budesonide-Formoterol Fumarate (SYMBICORT IN)  cholecalciferol, vitamin D3, 1,000 unit tablet  latanoprost (XALATAN) 0.005 % ophthalmic solution  levothyroxine (SYNTHROID, LEVOTHROID) 112 MCG tablet  melatonin 5 MG tablet  timolol maleate (TIMOPTIC) 0.5 % ophthalmic solution        ALLERGIES:  Allergies   Allergen Reactions    Indomethacin Headache    Penicillins Hives     Pt reports she has tolerated amoxicillin in the past (e.g. prior to a dental procedure in April 2015)    Amoxicillin \"ok\"    Piroxicam Nausea       FAMILY HISTORY:  Family History   Problem Relation Age of Onset    Heart Disease Father 52.00    Cancer Mother         breast and lung       SOCIAL HISTORY:   Social History     Socioeconomic History    Marital status:      Spouse name: None    Number of children: None    Years of education: None    Highest education level: None   Tobacco Use    Smoking status: Never    Smokeless tobacco: Never    Tobacco comments:     quit in the 1970s   Vaping Use    Vaping Use: Some days   Substance and Sexual Activity    Alcohol use: Yes     Comment: Alcoholic Drinks/day: \"a few/week\" March 2015       VITALS:  BP (!) 190/83  " " Pulse 54   Temp 97.3  F (36.3  C) (Oral)   Resp 16   Ht 1.575 m (5' 2\")   Wt 102.5 kg (226 lb)   SpO2 99%   BMI 41.34 kg/m      PHYSICAL EXAM    General: alert, appears comfortable, no acute distress  HEENT: atraumatic, conjunctiva clear without erythema, EOM's intact, no nasal discharge  Neck: supple  Cardiac: RRR w/o audible murmur  Resp: bilateral clear lungs w/o wheezing, crackles or rhonchi; breathing comfortably on RA  Abdomen: soft, non-tender to palpation, no masses. BS normal  Extremities: no peripheral edema  Skin: no rashes or suspicious legions on exposed skin  Neuro: grossly normal CN; normal strength, sensation, & tone  Psych: affect congruent with mood  MSK: Mild TTP right flank. No skin changes, ecchymosis, erythema     LAB:  All pertinent labs reviewed and interpreted.       RADIOLOGY:  Reviewed all pertinent imaging. Please see official radiology report.  No orders to display     PROCEDURES:   None.    I, Wilmer Gusman, am serving as a scribe to document services personally performed by Billy Garcia DO based on my observation and the provider's statements to me. I, Billy Garcia DO, attest that Wilmer Gusman is acting in a scribe capacity, has observed my performance of the services and has documented them in accordance with my direction.    Billy Garcia DO  [facility] EMERGENCY ROOM  [address] [id number] [phone]       Billy Garcia DO  Resident  09/27/23 1037    "

## 2023-09-27 NOTE — ED PROVIDER NOTES
"Emergency Department Midlevel Supervisory Note     I personally saw the patient and performed a substantive portion of the visit including all aspects of the medical decision making.    ED Course:  ED Course as of 09/27/23 1035   Wed Sep 27, 2023   0925 Billy Garcia DO, [facility] Resident met with the patient to gather history and to perform an initial exam.    0931 Billy Garcia DO, [facility] Resident staffed patient with me. I agree with their assessment and plan of management, and I will see the patient.  m my initial exam, and discuss the plan.    0940 I met with the patient to gather history and to perform my initial exam. We discussed plans for the ED course, including diagnostic testing and treatment.     1025 Pt feeling much better after medications here. Short Rx for oxycodone/flexeril, has good, close follow up, including bronchoscopy tomorrow. Understands return precautions.       Brief HPI:     Linda J Mrozinski is a 73 year old female with a pertinent medical history of hepatic steatosis, HTN, hypothyroidism, morbid obesity, who presents for evaluation of back pain.     Patient reports that having approximately 2 days worth of constant right lower back pain characterized as a poking pain. She notes that initially the pain was mild, but then it significantly worsened at approximately 1:30 AM today. She states that she took 2 Tylenol at approximately 2:30 AM today for her symptoms, but she denies it providing any relief. She endorses having similar pain in January 2023 when she was found to have \"a spot\" in her lung that is still currently being evaluated. She denies any known provoking factors of her symptoms, including pressure or deep inhalation, but she notes that standing alleviates her pain. She denies any associated shortness of breath or skin color change. She denies any recent swelling, fever, chills, or any other complications at this time.       IWilmer " "Naseem, am serving as a scribe to document services personally performed by Reagan Jaramillo DO, based on my observations and the provider's statements to me.   I, Reagan Jaramillo DO attest that Wilmer Naseem was acting in a scribe capacity, has observed my performance of the services and has documented them in accordance with my direction.    Brief Physical Exam: BP (!) 190/83   Pulse 54   Temp 97.3  F (36.3  C) (Oral)   Resp 16   Ht 1.575 m (5' 2\")   Wt 102.5 kg (226 lb)   SpO2 99%   BMI 41.34 kg/m    Constitutional:  Alert, in no acute distress  EYES: Conjunctivae clear  HENT:  Atraumatic, normocephalic  Respiratory:  Respirations even, unlabored, in no acute respiratory distress  Cardiovascular:  Regular rate and rhythm, good peripheral perfusion  GI: Soft, nondistended, nontender, no palpable masses, no rebound, no guarding Negative McBurney's.   Musculoskeletal:  No edema. No cyanosis. Range of motion major extremities intact. No CVA tenderness. No rash on back. Patient has 2+ radial and PT pulses bilaterally. No calf tenderness or swelling.   Integument: Warm, Dry, No erythema, No rash.   Neurologic:  Alert & oriented, no focal deficits noted  Psych: Normal mood and affect     MDM:  Pt seen in conjunction with resident, Dr. Garcia. Pt here with recurrent right low back/flank pain related to known right lower lobe mass she's having outpatient workup on with biopsy performed on 9/15/23.  No complications with biopsy and scheduled for bronchoscopy tomorrow morning. Pt with recurrent, worsening pain identical to previous pain with this since Monday, has nothing besides Tylenol for pain at home. She denies dyspnea, pleuritic pain, n/v, abd pain, hematuria, dysuria or other concerns. She's vitally well with no tachycardia, fever or hypoxia.  Will treat with symptomatically, which is all she wants at this time with plan for bronchoscopy tomorrow.  Pt advised on strict return precautions since we are not " performing a new workup for this pain.    ED Course as of 09/27/23 1035   Wed Sep 27, 2023   0925 Billy Garcia DO, [facility] Resident met with the patient to gather history and to perform an initial exam.    0931 Billy Garcia DO, [facility] Resident staffed patient with me. I agree with their assessment and plan of management, and I will see the patient.  m my initial exam, and discuss the plan.    0940 I met with the patient to gather history and to perform my initial exam. We discussed plans for the ED course, including diagnostic testing and treatment.     1025 Pt feeling much better after medications here. Short Rx for oxycodone/flexeril, has good, close follow up, including bronchoscopy tomorrow. Understands return precautions.       1. Right flank pain    2. Right lower lobe lung mass        Labs and Imaging:     I have reviewed the relevant laboratory and radiology studies    Procedures:  I was present for the key portions of this procedure: none    Reagan Jaramillo DO  [facility] EMERGENCY ROOM  [facility] [facility] [id number] [phone]       Reagan Jaramillo MD  09/27/23 1036

## 2023-09-27 NOTE — ED TRIAGE NOTES
Patient has right flank pain which started yesterday. She reports similar symptoms previously which she had workup for and noted to have a mass on her lung, had lung biopsy 9/15. Scheduled for bronchoscopy tomorrow. Took 500mg tylenol this morning 0230 which did not help. Pain 7/10. Denies dyspnea.      Triage Assessment       Row Name 09/27/23 0914       Triage Assessment (Adult)    Airway WDL WDL       Respiratory WDL    Respiratory WDL WDL       Skin Circulation/Temperature WDL    Skin Circulation/Temperature WDL WDL       Cardiac WDL    Cardiac WDL X  HTN       Cognitive/Neuro/Behavioral WDL    Cognitive/Neuro/Behavioral WDL WDL

## 2023-09-27 NOTE — TELEPHONE ENCOUNTER
Nurse Triage SBAR    Is this a 2nd Level Triage?  Yes    Situation:    Chest pain     Background/Assessment:     Pt reporting, having chest pain on the right side yesterday and into the night and still this morning.    Pain level 7/10 on the pain scale.     The pain is so bad it is bring tears to her eyes.         Pt has tried tylenol, hot shower, and still having chest pain.    I suggested ER for Pt care.   Pt agreed.   Will go to the ER for further evaluation for Pt care.    Will somebody please follow up with the Pt later today to see how she is after she finishes at the ER.     Thank you     Leena Ferrari RN  Central Triage Red Flags/Med Refills        Protocol Recommended Disposition:   Go to ED Now        Reason for Disposition   SEVERE chest pain    Additional Information   Negative: SEVERE difficulty breathing (e.g., struggling for each breath, speaks in single words)   Negative: Difficult to awaken or acting confused (e.g., disoriented, slurred speech)   Negative: Shock suspected (e.g., cold/pale/clammy skin, too weak to stand, low BP, rapid pulse)   Negative: Passed out (i.e., lost consciousness, collapsed and was not responding)   Negative: Chest pain lasting longer than 5 minutes and over 44 years old   Negative: Chest pain lasting longer than 5 minutes, over 30 years old, and at least one cardiac risk factor (e.g., diabetes mellitus, high blood pressure, high cholesterol, smoker, or strong family history of heart disease)   Negative: Chest pain lasting longer than 5 minutes and history of heart disease (i.e., angina, heart attack, heart failure, bypass surgery, takes nitroglycerin)   Negative: Chest pain lasting longer than 5 minutes and pain is crushing, pressure-like, or heavy   Negative: Heart beating < 50 beats per minute OR > 140 beats per minute   Negative: Visible sweat on face or sweat dripping down face   Negative: Sounds like a life-threatening emergency to the triager   Negative: Followed an  injury to chest    Protocols used: Chest Pain-A-[place]

## 2023-09-28 NOTE — OR NURSING
Pt instructed by Dr Gupta to follow up with surgeon, verbalized understanding and that she already has an appointment

## 2023-09-28 NOTE — ANESTHESIA PROCEDURE NOTES
Airway       Patient location during procedure: OR       Procedure Start/Stop Times: 9/28/2023 10:19 AM  Staff -        CRNA: Ami Weiner APRN CRNA       Performed By: CRNAIndications and Patient Condition       Indications for airway management: arnaud-procedural       Induction type:intravenous       Mask difficulty assessment: 2 - vent by mask + OA or adjuvant +/- NMBA    Final Airway Details       Final airway type: endotracheal airway       Successful airway: ETT - single and Oral  Endotracheal Airway Details        ETT size (mm): 8.0       Cuffed: yes       Cuff volume (mL): 5       Successful intubation technique: direct laryngoscopy       DL Blade Type: Cuevas 2       Grade View of Cords: 1       Adjucts: stylet       Position: Right       Measured from: lips       Secured at (cm): 19       Bite block used: None    Post intubation assessment        Placement verified by: capnometry, equal breath sounds and chest rise        Number of attempts at approach: 1       Number of other approaches attempted: 0       Secured with: commercial tube kaufman       Ease of procedure: easy       Dentition: Dental injury    Medication(s) Administered   Medication Administration Time: 9/28/2023 10:19 AM

## 2023-09-28 NOTE — PROCEDURES
FIBEROPTIC BRONCHOSCOPY / EBUS PROCEDURE NOTE     Date of Procedure: 09/28/2023  Performing Physician: Miguel Hayes MD  Pre-Procedure Diagnosis:     RLL lung cancer    Post-Procedure Diagnosis:    S/p mediastinal lymph biopsy of stations 11R inferior, 11R superior and 7 (negative preliminary path results)  No airway lesions     Procedure:  Diagnostic Flexible Fiberoptic Bronchoscopy   Indications:  Linda J Mrozinski is a 73 year old female with history of lung cancer , RLL tumor, A diagnostic EBUS bronchoscopy was requested to assess lymph node involvement.   Preop evaluation:  Procedure:  Intravenous Sedation.    Expected level:  Deep sedation  ASA Class:    Mallampati:  III/IV  Anesthesia:  General Anesthesia: See anesthesia flowsheet for details  Specimen:    Biopsy of lymph node station 11R superior, 11R inferior and 7  Estimated Blood Loss:  3 ml  Complications:  None    Findings:  Vocal Cords  , not seen , patient is intubated  Trachea ET tube 3 cm above sj, mild hyperemic mucosa  Sj , sharp, pitting of mucosa, no lesions   Right Bronchial Tree , normal mucosa, no significant secretions, no endobronchial lesions  Left Bronchial Tree , normal mucosa, no significant secretions, no endobronchial lesions    Procedure Details:   The patient was seen and the risks, benefits, complications, treatment options, and expected outcomes were discussed with PATIENT   . The risks and potential complications of their problem and proposed treatment include but are not limited to infection, bleeding, pain, adverse drug reaction, pulmonary aspiration, the need for additional procedures, failure to diagnose a condition, creating a complication requiring transfusion or operation, and complication secondary to the anesthetic.  The patient/alternate (see above) concurred with the proposed plan, giving informed consent.  The patient was identified as Linda J Mrozinski with Date of Birth 1950 and the procedure  verified as Diagnostic Flexible Fiberoptic Bronchoscopy .  A Time Out was held and the above information confirmed.    The bronchoscope was passed through the ET tube. The scope was then passed into the trachea. Careful inspection of the tracheal lumen was accomplished. The scope was sequentially passed into the left main and then left upper and lower bronchi and segmental bronchi.   Findings and specimen details recorded above.  The scope was then withdrawn and advanced into the right main bronchus and then into the RUL, RML, and RLL bronchi and segmental bronchi.   Findings and specimen details recorded above.     Then, the EBUS scope inserted through the ET tube. The following stations were examined with pertinent findings noted below.     4R:    Difficult to see  4L:   Not seen  7:   Small, biopsies were done, pathology on site, preliminary report, lymph tissue, no malignancy  10R:   Not seen   10L:   Not seen  11R superior:  Small, biopsies were done, pathology on site, preliminary report, lymph tissue, no malignancy  11R inferior:  Small, biopsies were done, pathology on site, preliminary report, lymph tissue, no malignancy  11L:   Not assess        Samples were obtained from lymph node stations 11R inferior , 11R superior, 7    The EBUS guided bx was deemed adequate once the adequate lymph node sampling/preliminary diagnosis was achieve or adequate sampling attempts were made.     The patient tolerated the procedure well.      Miguel Hayes MD, 09/28/2023 1:17 PM      Referring Physician: * No referring provider recorded for this case *  Attending Physician: Miguel Mcdonald*  Primary Care Physician: Griffin Brian

## 2023-09-28 NOTE — ANESTHESIA CARE TRANSFER NOTE
Patient: Linda J Mrozinski    Procedure: Procedure(s):  BRONCHOSCOPY, WITH ENDOBRONCHIAL ULTRASOUND       Diagnosis: Pulmonary nodules [R91.8]  Diagnosis Additional Information: No value filed.    Anesthesia Type:   General     Note:    Oropharynx: oropharynx clear of all foreign objects and spontaneously breathing  Level of Consciousness: drowsy  Oxygen Supplementation: face mask  Level of Supplemental Oxygen (L/min / FiO2): 5  Independent Airway: airway patency satisfactory and stable  Dentition: dentition unchanged  Vital Signs Stable: post-procedure vital signs reviewed and stable  Report to RN Given: handoff report given  Patient transferred to: PACU    Handoff Report: Identifed the Patient, Identified the Reponsible Provider, Reviewed the pertinent medical history, Discussed the surgical course, Reviewed Intra-OP anesthesia mangement and issues during anesthesia, Set expectations for post-procedure period and Allowed opportunity for questions and acknowledgement of understanding  Vitals:  Vitals Value Taken Time   /68 09/28/23 1140   Temp     Pulse 61 09/28/23 1141   Resp 22 09/28/23 1141   SpO2 100 % 09/28/23 1141   Vitals shown include unvalidated device data.    Electronically Signed By: ZURI Dempsey CRNA  September 28, 2023  11:43 AM

## 2023-09-28 NOTE — OR NURSING
Provider handed specimens to Cytology technician for slide preparation and viewing by pathologist in OR. Specimens transported back to lab to Cytology technician.

## 2023-09-28 NOTE — ANESTHESIA PREPROCEDURE EVALUATION
"Anesthesia Pre-Procedure Evaluation    Patient: Linda J Mrozinski   MRN: [id number] : 1950        Procedure : Procedure(s):  BRONCHOSCOPY, WITH ENDOBRONCHIAL ULTRASOUND          History reviewed. No pertinent past medical history.   Past Surgical History:   Procedure Laterality Date    EYE SURGERY      JOINT REPLACEMENT Right 2011    right knee    LAPAROSCOPIC CHOLECYSTECTOMY N/A 2015    Procedure: CHOLECYSTECTOMY LAPAROSCOPIC;  Surgeon: Hussain Langford MD;  Location: [facility];  Service:     TUBAL LIGATION        Allergies   Allergen Reactions    Indomethacin Headache    Penicillins Hives     Pt reports she has tolerated amoxicillin in the past (e.g. prior to a dental procedure in 2015)    Amoxicillin \"ok\"    Piroxicam Nausea      Social History     Tobacco Use    Smoking status: Never    Smokeless tobacco: Never    Tobacco comments:     quit in the 1970s   Substance Use Topics    Alcohol use: Yes     Comment: Alcoholic Drinks/day: \"a few/week\" 2015      Wt Readings from Last 1 Encounters:   23 102.5 kg (226 lb)        Anesthesia Evaluation   Pt has had prior anesthetic.         ROS/MED HX  ENT/Pulmonary: Comment: RLL mass      Neurologic:  - neg neurologic ROS     Cardiovascular: Comment: EKG  Normal sinus rhythm  Normal ECG  When compared with ECG of 14-MAR-2015 13:04,  No significant change was found  Confirmed by NEDA MAGAÑA, LES LOC:JN ([id number]) on 2015 4:54:37 PM       (+)  hypertension- -   -  - -                                   (-) murmur and wheezes   METS/Exercise Tolerance: >4 METS    Hematologic:  - neg hematologic  ROS     Musculoskeletal:  - neg musculoskeletal ROS     GI/Hepatic:     (+) GERD,            liver disease,       Renal/Genitourinary:  - neg Renal ROS     Endo:     (+)          thyroid problem,     Obesity,       Psychiatric/Substance Use:  - neg psychiatric ROS     Infectious Disease:  - neg infectious disease ROS     Malignancy:  - neg " malignancy ROS     Other:  - neg other ROS          Physical Exam    Airway        Mallampati: II   TM distance: > 3 FB   Neck ROM: full   Mouth opening: > 3 cm    Respiratory Devices and Support         Dental  no notable dental history     (+) Minor Abnormalities - some fillings, tiny chips      Cardiovascular          Rhythm and rate: regular and normal (-) no murmur    Pulmonary           breath sounds clear to auscultation   (-) no wheezes        OUTSIDE LABS:  CBC:   Lab Results   Component Value Date    HGB 15.6 09/15/2023    HGB 13.8 09/08/2023     09/15/2023     BMP:   Lab Results   Component Value Date     06/12/2023     06/13/2022    POTASSIUM 4.3 06/12/2023    POTASSIUM 4.2 06/13/2022    CHLORIDE 107 06/12/2023    CHLORIDE 101 06/13/2022    CO2 26 06/12/2023    CO2 26 06/13/2022    BUN 14.3 06/12/2023    BUN 16 06/13/2022    CR 0.6 09/17/2023    CR 0.68 06/12/2023     (H) 06/12/2023    GLC 99 06/13/2022     COAGS:   Lab Results   Component Value Date    PTT 25 09/15/2023    INR 0.98 09/15/2023     POC: No results found for: BGM, HCG, HCGS  HEPATIC:   Lab Results   Component Value Date    ALBUMIN 4.0 06/12/2023    PROTTOTAL 7.5 06/12/2023    ALT 12 06/12/2023    AST 15 06/12/2023    ALKPHOS 111 (H) 06/12/2023    BILITOTAL 0.9 06/12/2023     OTHER:   Lab Results   Component Value Date    ANTONY 9.5 06/12/2023    TSH 0.89 06/12/2023       Anesthesia Plan    ASA Status:  3    NPO Status:  NPO Appropriate    Anesthesia Type: General.     - Airway: ETT   Induction: Intravenous, Propofol.   Maintenance: TIVA.        Consents    Anesthesia Plan(s) and associated risks, benefits, and realistic alternatives discussed. Questions answered and patient/representative(s) expressed understanding.     - Discussed: Risks, Benefits and Alternatives for BOTH SEDATION and the PROCEDURE were discussed     - Discussed with:  Patient       - Patient is DNR/DNI Status: No          Postoperative  Care    Pain management: IV analgesics, Oral pain medications.   PONV prophylaxis: Ondansetron (or other 5HT-3), Dexamethasone or Solumedrol     Comments:    Other Comments: GETA  Decadron/Zofran for PONV  Full TIVA  Use 8.0 ETT or larger            Forest Sandoval MD

## 2023-09-28 NOTE — ANESTHESIA POSTPROCEDURE EVALUATION
Patient: Linda J Mrozinski    Procedure: Procedure(s):  BRONCHOSCOPY, WITH ENDOBRONCHIAL ULTRASOUND       Anesthesia Type:  General    Note:  Disposition: Inpatient   Postop Pain Control: Uneventful            Sign Out: Well controlled pain   PONV: No   Neuro/Psych: Uneventful            Sign Out: Acceptable/Baseline neuro status   Airway/Respiratory: Uneventful            Sign Out: Acceptable/Baseline resp. status   CV/Hemodynamics: Uneventful            Sign Out: Acceptable CV status; No obvious hypovolemia; No obvious fluid overload   Other NRE: NONE   DID A NON-ROUTINE EVENT OCCUR? No           Last vitals:  Vitals Value Taken Time   /86 09/28/23 1230   Temp 35.9  C (96.7  F) 09/28/23 1139   Pulse 64 09/28/23 1238   Resp 15 09/28/23 1238   SpO2 96 % 09/28/23 1238   Vitals shown include unvalidated device data.    Electronically Signed By: Forest Sandoval MD  September 28, 2023  3:18 PM

## 2023-09-28 NOTE — DISCHARGE SUMMARY
Physician Discharge Summary     Patient ID:  Geneva SOMERS Mrmarcii  [id number]  73 year old  1950    Admit date: 9/28/2023    Discharge date and time: 9/28/2023     Admitting Physician: Miguel Hayes MD     Discharge Physician: Miguel Hayes MD    Admission Diagnoses:   RLL lung cancer    Discharge Diagnoses:   RLL lung cancer  S/p biopsy of mediastinal lymph nodes    Admission Condition: good    Discharged Condition: good    Indication for Admission:  Schedule for bronchoscopy / EBUS     Hospital Course:   Procedure was done under general anesthesia.   Bronchoscopy evaluation of airway showed no endobronchial lesion.   Biopsy of lymph node station 11R superior, 11R inferior and 7.   Adequate sample per pathology on site.   Patient was extubated and transferred to post anesthesia unit.   Favorable course.     Significant Diagnostic Studies: S/p bronchoscopy / EBUS    Discharge Exam:  BP (!) 157/78   Pulse 60   Temp (!) 96.7  F (35.9  C) (Temporal)   Resp 15   Wt 102.5 kg (226 lb)   SpO2 96%   BMI 41.34 kg/m    Gen: awake, alert, no distress  HEENT: pink conjunctiva, moist mucosa, Mallampati II/IV  Neck: no thyromegaly, masses or JVD  Lungs: clear  CV: regular, no murmurs or gallops appreciated  Abdomen: soft, NT, BS wnl  Ext: no edema  Neuro: CN II-XII intact, non focal       Disposition: home    Patient Instructions:   Discharge Medication List as of 9/28/2023  1:18 PM        CONTINUE these medications which have NOT CHANGED    Details   albuterol (PROVENTIL HFA;VENTOLIN HFA) 90 mcg/actuation inhaler [ALBUTEROL (PROVENTIL HFA;VENTOLIN HFA) 90 MCG/ACTUATION INHALER] Inhale 1-2 puffs every 6 (six) hours as needed for wheezing or shortness of breath., Historical      brinzolamide (AZOPT) 1 % ophthalmic suspension [BRINZOLAMIDE (AZOPT) 1 % OPHTHALMIC SUSPENSION] Administer 1 drop to both eyes 2 (two) times a day., Historical      Budesonide-Formoterol Fumarate (SYMBICORT IN) Historical       cholecalciferol, vitamin D3, 1,000 unit tablet [CHOLECALCIFEROL, VITAMIN D3, 1,000 UNIT TABLET] Take 2,000 Units by mouth daily., Historical      cyclobenzaprine (FLEXERIL) 5 MG tablet Take 1 tablet (5 mg) by mouth nightly as needed for muscle spasms, Disp-30 tablet, R-0, Local Print      latanoprost (XALATAN) 0.005 % ophthalmic solution [LATANOPROST (XALATAN) 0.005 % OPHTHALMIC SOLUTION] Administer 1 drop to both eyes bedtime., Historical      levothyroxine (SYNTHROID, LEVOTHROID) 112 MCG tablet [LEVOTHYROXINE (SYNTHROID, LEVOTHROID) 112 MCG TABLET] Take 112 mcg by mouth Daily at 6:00 am., Historical      melatonin 5 MG tablet Take 5 mg by mouth nightly as needed for sleep, Historical      oxyCODONE (ROXICODONE) 5 MG tablet Take 1 tablet (5 mg) by mouth every 6 hours as needed for severe pain, Disp-6 tablet, R-0, Local Print      timolol maleate (TIMOPTIC) 0.5 % ophthalmic solution Historical           Activity: activity as tolerated  Diet: regular diet  Wound Care: none needed    Follow-up in 2 weeks with thoracic surgery    Signed:  Miguel Hayes MD  9/28/2023  1:29 PM

## 2023-09-28 NOTE — H&P
PULMONARY H&P NOTE        Assessment:   Lung cancer  PET (+) RLL mass, path (+) NSCL cancer.   Negative uptake of mediastinal nodes.   Plan for EBUS bronchoscopy prior to surgery.      Plan:      No contraindications for scheduled EBUS bronchoscopy         Miguel Hayes  Pulmonary / Critical Care  September 28, 2023        CC:     Schedule for EBUS bronchoscopy        HPI:         Linda J Mrozinski is a 73 year old female who presents for scheduled EBUS bronchoscopy   Patient with history of asthma, hypothyroidism, lung cancer, obesity.   Patient was evaluated in the pulmonary clinic for PET(+) RLL mass, negative uptake in mediastinal nodes. CT guided biopsy (+) NSCL cancer. Patient was evaluated by thoracic surgery , recommending EBUS bronchoscopy prior lung tumor resection.   Patient was scheduled for EBUS bronchoscopy prior to lung resection.   No SOB, chest pain. No orthopnea, PND or swelling of LEs      Past Medical History :     Asthma  Lung cancer     Medications:     No current facility-administered medications on file prior to encounter.  levothyroxine (SYNTHROID, LEVOTHROID) 112 MCG tablet, [LEVOTHYROXINE (SYNTHROID, LEVOTHROID) 112 MCG TABLET] Take 112 mcg by mouth Daily at 6:00 am.  albuterol (PROVENTIL HFA;VENTOLIN HFA) 90 mcg/actuation inhaler, [ALBUTEROL (PROVENTIL HFA;VENTOLIN HFA) 90 MCG/ACTUATION INHALER] Inhale 1-2 puffs every 6 (six) hours as needed for wheezing or shortness of breath.  brinzolamide (AZOPT) 1 % ophthalmic suspension, [BRINZOLAMIDE (AZOPT) 1 % OPHTHALMIC SUSPENSION] Administer 1 drop to both eyes 2 (two) times a day.  Budesonide-Formoterol Fumarate (SYMBICORT IN),   cholecalciferol, vitamin D3, 1,000 unit tablet, [CHOLECALCIFEROL, VITAMIN D3, 1,000 UNIT TABLET] Take 2,000 Units by mouth daily.  latanoprost (XALATAN) 0.005 % ophthalmic solution, [LATANOPROST (XALATAN) 0.005 % OPHTHALMIC SOLUTION] Administer 1 drop to both eyes bedtime.  timolol maleate (TIMOPTIC)  "0.5 % ophthalmic solution,        Social History :     Social History     Socioeconomic History    Marital status:      Spouse name: Not on file    Number of children: Not on file    Years of education: Not on file    Highest education level: Not on file   Occupational History    Not on file   Tobacco Use    Smoking status: Never    Smokeless tobacco: Never    Tobacco comments:     quit in the 1970s   Vaping Use    Vaping Use: Some days   Substance and Sexual Activity    Alcohol use: Yes     Comment: Alcoholic Drinks/day: \"a few/week\" March 2015    Drug use: Not on file    Sexual activity: Not on file   Other Topics Concern    Not on file   Social History Narrative    Not on file     Social Determinants of Health     Financial Resource Strain: Not on file   Food Insecurity: Not on file   Transportation Needs: Not on file   Physical Activity: Not on file   Stress: Not on file   Social Connections: Not on file   Interpersonal Safety: Not on file   Housing Stability: Not on file          Family History :     Family History   Problem Relation Age of Onset    Heart Disease Father 52.00    Cancer Mother         breast and lung       Review of Systems  A 12 point comprehensive review of systems was negative except as noted.        Objective:     BP (!) 186/88 (BP Location: Left arm)   Pulse 72   Temp 98.2  F (36.8  C) (Oral)   Resp 18   Wt 102.5 kg (226 lb)   SpO2 98%   BMI 41.34 kg/m        Gen: obese, awake, alert, no distress  HEENT: pink conjunctiva, moist mucosa, Mallampati II/IV  Neck: no thyromegaly, masses or JVD  Lungs: clear  CV: regular, no murmurs or gallops appreciated  Abdomen: soft, NT, BS wnl  Ext: no edema  Neuro: CN II-XII intact, non focal      Diagnostic tests:     A(1) Right lower lobe lung mass. :  -Non-small cell carcinoma (moderately differentiated adenocarcinoma)  - Pending MDL testing    IMAGES:     PET CT   Increase uptake of RLL mass    "

## 2023-10-01 NOTE — ED TRIAGE NOTES
pt states was here Wednesday for same. Pt c/o right flank pain into right mid abdominal area, states pain getting worse over night and did not sleep well, pt took muscle relaxer and oxy that was sent home with at 1140pm nothing since. Denies n/v, denies urinary Sx.

## 2023-10-01 NOTE — ED PROVIDER NOTES
EMERGENCY DEPARTMENT ENCOUNTER      NAME: Linda J Mrozinski  AGE: 73 year old female  YOB: 1950  MRN: [id number]  EVALUATION DATE & TIME: 10/1/2023  9:54 AM    PCP: Grififn Brian    ED PROVIDER: Chiquita Araiza PA-C      Chief Complaint   Patient presents with    Abdominal Pain    Flank Pain         FINAL IMPRESSION:  1. Right flank pain    2. Pulmonary nodule    3. Hypertension          ED COURSE & MEDICAL DECISION MAKING:    10:15 AM Met with patient for initial interview. Plan for care discussed.  11:53 AM RN alerted BMP misplaced by lab, running now. Once Cr results will get imaging underway.   2:15 PM Reevaluated and updated patient. I discussed the plan for discharge with the patient, and patient is agreeable. We discussed supportive cares at home and reasons for return to the ER including new or worsening symptoms. All questions and concerns addressed. Patient to be discharged by RN.    73 year old female with a history of right lung mass s/p bronchoscopy and biopsy (9/28/23) presents to the Emergency Department for evaluation of right flank pain that has been persistent since January 2023 that has been attributed to a RLL lung mass. Per chart review, patient was last seen in the ER on 9/27/23 with similar pain and discharged home on Flexeril and oxycodone. She underwent bronchoscopy on 9/28/23 of right lung mass that shows no malignancy. She has a follow up with pulmonology on Thursday. Upon exam, patient is afebrile, hypertensive, appears anxious and tearful. Right CVA tenderness to percussion and mild reproducible right flank tenderness to palpation with no overlying skin changes or palpable crepitus. Discussed options including pain management vs additional work-up. Differential diagnosis includes but not limited to nephrolithiasis, pyelonephritis, hepatitis, diverticulitis, PE, pneumothorax, pneumonia, electrolyte abnormality, anemia, dehydration. Based on patient's presenting  increase in symptoms, using shared decision making, laboratories and imaging were ordered.    CBC without leukocytosis or anemia. BMP with mild hyperglycemia, otherwise WNL. HFP WNL. Lipase WNL. UA with leukocyte esterase and few bacteria, but 3 squamous epithelials likely contaminating sample, culture pending. CT revealed slightly larger solid nodular lesion at the right lower lobe compared to 08/16/2023 - mild progression of a neoplasm is possible. There are stable several bilateral small pulmonary nodules. Recommend follow-up CT chest in three months.     Patient was treated with Oxycodone, Lidocaine patch upon arrival with moderate improvement in symptoms. Patient was given Dilaudid and Zofran prior to discharge with good pain relief. Symptoms and workup most consistent with chronic pulmonary nodule. Patient was made aware of the above findings. Discussed elevated BP in the ER today. Patient attributing BP to pain, denies chest pain, shortness of breath, headache, vision changes, and agrees to follow up with PCP for BP recheck later this week and elects to discharge home. Plan to discharge patient home with prescription oxycodone, strict return precautions, and close follow up with their pulmonologist as scheduled on Thursday for reevaluation and ongoing management. The patient was stable and well appearing upon discharge. The patient was advised to return to the ER if any new or worsening symptoms develop. The patient verbalizes understanding and agrees with the plan.     Medical Decision Making    History:  Supplemental history from: Documented in chart, if applicable  External Record(s) reviewed: Documented in chart, if applicable.    Work Up:  Chart documentation includes differential considered and any EKGs or imaging independently interpreted by provider, where specified.  In additional to work up documented, I considered the following work up: Documented in chart, if applicable.    External  consultation:  Discussion of management with another provider: Documented in chart, if applicable    Complicating factors:  Care impacted by chronic illness: Other: pulmonary nodules  Care affected by social determinants of health: N/A    Disposition considerations: Discharge. I prescribed additional prescription strength medication(s) as charted. See documentation for any additional details.        MEDICATIONS GIVEN IN THE EMERGENCY:  Medications   oxyCODONE (ROXICODONE) tablet 5 mg (5 mg Oral $Given 10/1/23 1043)   iopamidol (ISOVUE-370) solution 100 mL (100 mLs Intravenous $Given 10/1/23 1233)   HYDROmorphone (PF) (DILAUDID) injection 0.5 mg (0.5 mg Intravenous $Given 10/1/23 1451)   ondansetron (ZOFRAN) injection 4 mg (4 mg Intravenous $Given 10/1/23 1447)       NEW PRESCRIPTIONS STARTED AT TODAY'S ER VISIT  Discharge Medication List as of 10/1/2023  3:25 PM             =================================================================    HPI    Patient information was obtained from: patient    Use of : N/A       Linda J Mrozinski is a 73 year old female with a pertinent history of right lung mass s/p bronchoscopy and biopsy (9/28/23) who presents to this ED for evaluation of persistent right flank pain. Per chart review, patient was seen in the ER on 9/27/23 with similar pain and discharged home on Flexeril and oxycodone for pain. She underwent bronchoscopy on 9/28/23 of right lung mass that shows no malignancy. Since then, patient reports persistent pain that feels similar in quality, but now has associated nausea and increasing pain. She reports initial improvement with flexeril and oxycodone, but concerned there is something else going on and does not like taking pain pills chronically.       REVIEW OF SYSTEMS   Review of Systems   Constitutional:  Negative for chills and fever.   HENT:  Positive for congestion. Negative for rhinorrhea, sore throat, trouble swallowing and voice change.   "  Respiratory:  Positive for cough. Negative for shortness of breath.    Cardiovascular:  Negative for chest pain.   Gastrointestinal:  Positive for abdominal pain, constipation and nausea. Negative for blood in stool, diarrhea and vomiting.   Genitourinary:  Positive for flank pain. Negative for dysuria and hematuria.   Musculoskeletal:  Negative for back pain.   Skin:  Negative for rash.   Neurological:  Negative for weakness and numbness.     PAST MEDICAL HISTORY:  No past medical history on file.    PAST SURGICAL HISTORY:  Past Surgical History:   Procedure Laterality Date    BRONCHOSCOPY RIGID OR FLEXIBLE W/TRANSENDOSCOPIC ENDOBRONCHIAL ULTRASOUND GUIDED N/A 9/28/2023    Procedure: BRONCHOSCOPY, WITH ENDOBRONCHIAL ULTRASOUND;  Surgeon: Miguel Mcdonald MD;  Location[facility]    EYE SURGERY      JOINT REPLACEMENT Right 01/01/2011    right knee    LAPAROSCOPIC CHOLECYSTECTOMY N/A 03/18/2015    Procedure: CHOLECYSTECTOMY LAPAROSCOPIC;  Surgeon: Hussain Langford MD;  Location: [facility];  Service:     TUBAL LIGATION             CURRENT MEDICATIONS:    oxyCODONE (ROXICODONE) 5 MG tablet  albuterol (PROVENTIL HFA;VENTOLIN HFA) 90 mcg/actuation inhaler  brinzolamide (AZOPT) 1 % ophthalmic suspension  Budesonide-Formoterol Fumarate (SYMBICORT IN)  cholecalciferol, vitamin D3, 1,000 unit tablet  cyclobenzaprine (FLEXERIL) 5 MG tablet  latanoprost (XALATAN) 0.005 % ophthalmic solution  levothyroxine (SYNTHROID, LEVOTHROID) 112 MCG tablet  melatonin 5 MG tablet  timolol maleate (TIMOPTIC) 0.5 % ophthalmic solution        ALLERGIES:  Allergies   Allergen Reactions    Indomethacin Headache    Penicillins Hives     Pt reports she has tolerated amoxicillin in the past (e.g. prior to a dental procedure in April 2015)    Amoxicillin \"ok\"    Piroxicam Nausea       FAMILY HISTORY:  Family History   Problem Relation Age of Onset    Heart Disease Father 52.00    Cancer Mother         breast and lung " "      SOCIAL HISTORY:   Social History     Socioeconomic History    Marital status:    Tobacco Use    Smoking status: Never    Smokeless tobacco: Never    Tobacco comments:     quit in the 1970s   Vaping Use    Vaping Use: Some days   Substance and Sexual Activity    Alcohol use: Yes     Comment: Alcoholic Drinks/day: \"a few/week\" March 2015       VITALS:  BP (!) 191/84   Pulse 59   Temp 97.9  F (36.6  C) (Temporal)   Resp 20   Wt 102.5 kg (226 lb)   SpO2 98%   BMI 41.34 kg/m      PHYSICAL EXAM    Constitutional:  Alert, in no acute distress. Cooperative.  EYES: Conjunctivae clear.   HENT:  Atraumatic, normocephalic.  Respiratory:  Respirations even, unlabored, in no acute respiratory distress. Lungs clear to auscultation bilaterally without wheeze, rhonchi, or rales. No cough. Speaks in full sentences easily.  Cardiovascular:  Regular rate and rhythm, good peripheral perfusion. No peripheral edema. No chest wall tenderness.  GI: Soft, flat, non-distended. Bowel sounds normal. No tenderness to palpation. No guarding, rebound, or other peritoneal signs. Right CVA tenderness to percussion.   Musculoskeletal:  No edema. No cyanosis. Range of motion major extremities intact. No midline CTLS spinal tenderness to palpation. Mild reproducible right flank tenderness to palpation. No overlying erythema, warmth, purulence, fluctuance, ecchymosis, edema, crepitus.   Integument: Warm, Dry. No rash to visualized skin.   Neurologic:  Alert & oriented. No focal deficits noted.   Psych: Appears anxious and tearful.      LAB:  All pertinent labs reviewed and interpreted.  Results for orders placed or performed during the hospital encounter of 10/01/23   CT Chest (PE) Abdomen Pelvis w Contrast    Impression    IMPRESSION:  1.  No evidence for pulmonary embolism. No acute abnormality identified.  2.  Slightly larger solid nodular lesion at the right lower lobe compared to 08/16/2023. Mild progression of a neoplasm is " possible. There are stable several bilateral small pulmonary nodules. Recommend follow-up CT chest in three months.  3.  A few incidental stable findings as above.  4.  Prominent stool at the proximal colon.       Basic metabolic panel   Result Value Ref Range    Sodium 142 135 - 145 mmol/L    Potassium 3.9 3.4 - 5.3 mmol/L    Chloride 101 98 - 107 mmol/L    Carbon Dioxide (CO2) 28 22 - 29 mmol/L    Anion Gap 13 7 - 15 mmol/L    Urea Nitrogen 11.4 8.0 - 23.0 mg/dL    Creatinine 0.64 0.51 - 0.95 mg/dL    GFR Estimate >90 >60 mL/min/1.73m2    Calcium 9.4 8.8 - 10.2 mg/dL    Glucose 103 (H) 70 - 99 mg/dL   Hepatic function panel   Result Value Ref Range    Protein Total 7.8 6.4 - 8.3 g/dL    Albumin 4.1 3.5 - 5.2 g/dL    Bilirubin Total 0.9 <=1.2 mg/dL    Alkaline Phosphatase 102 35 - 104 U/L    AST 17 0 - 45 U/L    ALT 8 0 - 50 U/L    Bilirubin Direct 0.21 0.00 - 0.30 mg/dL   Result Value Ref Range    Lipase 15 13 - 60 U/L   UA with Microscopic reflex to Culture    Specimen: Urine, Clean Catch   Result Value Ref Range    Color Urine Light Yellow Colorless, Straw, Light Yellow, Yellow    Appearance Urine Clear Clear    Glucose Urine Negative Negative mg/dL    Bilirubin Urine Negative Negative    Ketones Urine Negative Negative mg/dL    Specific Gravity Urine 1.012 1.001 - 1.030    Blood Urine Negative Negative    pH Urine 6.5 5.0 - 7.0    Protein Albumin Urine Negative Negative mg/dL    Urobilinogen Urine <2.0 <2.0 mg/dL    Nitrite Urine Negative Negative    Leukocyte Esterase Urine 75 Jignesh/uL (A) Negative    Bacteria Urine Few (A) None Seen /HPF    Mucus Urine Present (A) None Seen /LPF    RBC Urine <1 <=2 /HPF    WBC Urine 3 <=5 /HPF    Squamous Epithelials Urine 3 (H) <=1 /HPF   CBC with platelets and differential   Result Value Ref Range    WBC Count 7.2 4.0 - 11.0 10e3/uL    RBC Count 4.85 3.80 - 5.20 10e6/uL    Hemoglobin 14.4 11.7 - 15.7 g/dL    Hematocrit 44.0 35.0 - 47.0 %    MCV 91 78 - 100 fL    MCH 29.7 26.5  - 33.0 pg    MCHC 32.7 31.5 - 36.5 g/dL    RDW 13.6 10.0 - 15.0 %    Platelet Count 250 150 - 450 10e3/uL    % Neutrophils 59 %    % Lymphocytes 29 %    % Monocytes 9 %    % Eosinophils 3 %    % Basophils 0 %    % Immature Granulocytes 0 %    NRBCs per 100 WBC 0 <1 /100    Absolute Neutrophils 4.2 1.6 - 8.3 10e3/uL    Absolute Lymphocytes 2.1 0.8 - 5.3 10e3/uL    Absolute Monocytes 0.6 0.0 - 1.3 10e3/uL    Absolute Eosinophils 0.2 0.0 - 0.7 10e3/uL    Absolute Basophils 0.0 0.0 - 0.2 10e3/uL    Absolute Immature Granulocytes 0.0 <=0.4 10e3/uL    Absolute NRBCs 0.0 10e3/uL       RADIOLOGY:  Reviewed all pertinent imaging. Please see official radiology report.  CT Chest (PE) Abdomen Pelvis w Contrast   Final Result   IMPRESSION:   1.  No evidence for pulmonary embolism. No acute abnormality identified.   2.  Slightly larger solid nodular lesion at the right lower lobe compared to 08/16/2023. Mild progression of a neoplasm is possible. There are stable several bilateral small pulmonary nodules. Recommend follow-up CT chest in three months.   3.  A few incidental stable findings as above.   4.  Prominent stool at the proximal colon.              Chiquita Araiza PA-C  [facility] EMERGENCY ROOM  [address] [address] [phone]      Chiquita Araiza PA-C  10/01/23 2125

## 2023-10-01 NOTE — DISCHARGE INSTRUCTIONS
You were seen in the ER for evaluation of right flank pain. Your workup and imaging revealed pulmonary nodule as discussed.    Rest, stay well hydrated (Pedialyte). You may take ibuprofen or Tylenol as needed for pain. You may take oxycodone as needed for severe pain - do NOT drive on this medication as it can cause drowsiness. Additionally, please take a stool softener as this medication can cause constipation. This medication can be addicting, please limit your use and discard any remaining tablets.     Tylenol (Acetaminophen) Discharge Instructions:  You may take 2 tablets of regular strength, over-the-counter, Tylenol (acetaminophen) every 4-6 hours as needed for pain.  Take no more than 4000 mg of Tylenol in a 24-hour period.      Avoid taking more than 1 acetaminophen-containing product at a time and be aware that many over-the-counter medications contain a combination of acetaminophen and other products.  If you are taking Tylenol in addition to a combination product please keep track of your daily acetaminophen dose to make sure you do not exceed the recommended 4000 mg.  Taking too much acetaminophen can cause permanent damage to your liver.    Ibuprofen/Naproxen Discharge Instructions:  You may take ibuprofen for pain control.  The maximum dose of (ibuprofen is 3200 mg ) in a 24-hour period.    Take this medication with food to prevent stomach irritation.  With long-term use this medication can irritate the stomach causing pain and lead to development of a stomach ulcer.  If you notice stomach pain or vomiting of coffee-ground colored vomit or blood, please be seen by a healthcare provider.  Attempt to use this medication for the shortest time possible.      Please follow up with your pulmonologist as scheduled on Thursday, or sooner as needed.    Follow-up with your primary care provider for blood pressure reevaluation and ongoing management.     Return to the emergency department for any new or worsening  symptoms including severe abdominal pain, vomiting, vomiting blood, multiple episodes of diarrhea, bloody stools, uncontrolled fever/chills, chest pain, shortness of breath, or any other concerning symptoms.     Take Care!  - Chiquita Araiza PA-C

## 2023-10-05 NOTE — PROGRESS NOTES
"THORACIC SURGERY FOLLOW UP VISIT    Dear Dr. Broderick,  I saw Mrs. Mrozinski in follow-up today. The clinical summary follows:     PREOP DIAGNOSIS   Right lower lobe lung mass  PROCEDURE   Percutaneous needle biopsy (by Interventional Radiology 09/15/2023) and Endobronchial ultrasound (by Dr. Gupta 09/28/2023)     HISTOPATHOLOGY   IR biopsy of RLL mass: Moderately differentiated adenocarcinoma  EBUS 11R and 7: Negative for malignancy    COMPLICATIONS  None    INTERVAL STUDIES  Brain CT: negative for metastatic disease  PFT: FEV1 1.54L (81%) DLCO 109%    Past Surgical History:   Procedure Laterality Date    BRONCHOSCOPY RIGID OR FLEXIBLE W/TRANSENDOSCOPIC ENDOBRONCHIAL ULTRASOUND GUIDED N/A 9/28/2023    Procedure: BRONCHOSCOPY, WITH ENDOBRONCHIAL ULTRASOUND;  Surgeon: Miguel Mcdonald MD;  Location: [facility]    EYE SURGERY      JOINT REPLACEMENT Right 01/01/2011    right knee    LAPAROSCOPIC CHOLECYSTECTOMY N/A 03/18/2015    Procedure: CHOLECYSTECTOMY LAPAROSCOPIC;  Surgeon: Hussain Langford MD;  Location: [facility];  Service:     TUBAL LIGATION       Social History     Socioeconomic History    Marital status:      Spouse name: Not on file    Number of children: Not on file    Years of education: Not on file    Highest education level: Not on file   Occupational History    Not on file   Tobacco Use    Smoking status: Never    Smokeless tobacco: Never    Tobacco comments:     quit in the 1970s   Vaping Use    Vaping Use: Former   Substance and Sexual Activity    Alcohol use: Yes     Comment: Alcoholic Drinks/day: \"a few/week\" March 2015    Drug use: Not on file    Sexual activity: Not on file   Other Topics Concern    Not on file   Social History Narrative    Not on file     Social Determinants of Health     Financial Resource Strain: Not on file   Food Insecurity: Not on file   Transportation Needs: Not on file   Physical Activity: Not on file   Stress: Not on file   Social " Connections: Not on file   Interpersonal Safety: Not on file   Housing Stability: Not on file       SUBJECTIVE   Geneva is doing ok. She is nervous about getting the results of her testing. She is also having pain along her lower right chest/flank area. This is the same pain she has been having since January. She did go to the ER this past weekend because of it. The tests they did were all negative. She is wondering if she should get her flu and Covid vaccines before surgery.    OBJECTIVE  BP (!) 145/93 (BP Location: Left arm, Patient Position: Sitting, Cuff Size: Adult Regular)   Pulse 69   Wt 101 kg (222 lb 9.6 oz)   SpO2 96%   BMI 40.71 kg/m       From a personal perspective, she is here with her sister.    IMPRESSION   73 year-old female with clinical stage IB non small cell lung cancer. She is here today to review staging results and to discuss next steps in treatment.    Given her clinical stage IB, the recommendation is surgical resection via right lower lobectomy (possible segmentectomy). I discussed the procedure with her, discussed the typical post operative stay of 1-3 nights, explained that she will have a chest tube after surgery and that she may discharge to home with a chest tube. I explained that we will want her up and walking as soon as possible after surgery to avoid development of DVTs or pneumonia.    I discussed the expected post operative recovery period to be about 6-8 weeks however, she may have nerve related pain that can remain for several months following surgery.    I encouraged her to work in increasing her activity level so she can improve endurance and to get her lungs ready for surgery. I also gave her an Incentive Spirometer and instructed her how to use this. She demonstrated correct usage of this in clinic with me. I encouraged her to use this 10 times a day each day before surgery as we will have her using this after surgery and it will be easier for her if she has been using  it before surgery.    She asked for suggestions on pain management. I suggested she use a Lidocaine patch-12 hours on and 12 hours off. During the off time she can use a heating pad to the area. She can also take ibuprofen as needed.    As long as there is at least 2 weeks between her vaccines and surgery that is fine for her to get them prior.    PLAN  I spent 40 min on the date of the encounter in chart review, patient visit, review of tests, documentation and/or discussion with other providers about the issues documented above. I reviewed the plan as follows:  Right lower lobectomy  PAC for pre-op H&P  1. Necessary Tests & Appointments: PAC  All questions were answered and the patient and present family were in agreement with the plan.  I appreciate the opportunity to participate in the care of your patient and will keep you updated.  Sincerely,

## 2023-10-05 NOTE — TELEPHONE ENCOUNTER
[facility]: Cancer Care Short Note                                    Discussion with Patient:                                                      Called patient to introduce self and explain role of Nurse Coordinator for Thoracic Surgery. Explained next steps to scheduling surgery and that she will be provided with the dates for her pre-op PAC and 1 month follow up appointments when she is contacted by the surgery scheduler with her surgery date.    Writer inquired if she has any questions. Patient verbalized that Soni explained a lot at today's clinic visit and she had no further questions at this time. Verbalized that she is very anxious, worried, and trying to take it all in. Reported that she will be making appointments to get her vaccines soon.        Intervention/Education provided during outreach:                                                       Provided patient with writer's direct contact information should any questions arise. Patient appreciated writer's call.    Signature:  Mita Hopkins RN, BSN  Thoracic Surgery RN Care Coordinator

## 2023-10-06 NOTE — TELEPHONE ENCOUNTER
Called pt and left detailed vcm explaining the surgery date may need to be moved due to start time being too late. Writer informed that a call next week with a new date/time will be made by the end of next week.    Gloria Meadows on 10/6/2023 at 3:15 PM

## 2023-10-06 NOTE — TELEPHONE ENCOUNTER
Spoke with patient to schedule procedure with Dr. Broderick   Procedure was scheduled on 10/17 at [facility] OR  Patient will have H&P with PAC    Patient is aware a COVID-19 test is needed before their procedure ONLY IF symptomatic.   (Patient is aware Thoracic is no longer requiring COVID-19 test)       Patient is aware a / is needed day of surgery.   Surgery Letter was sent via [employer],     Patient has my direct contact information for any further questions.     Gloria Meadows on 10/6/2023 at 12:27 PM       Appointments in Next Year      Oct 11, 2023  8:00 AM  (Arrive by 7:45 AM)  PAC EVALUATION with ZURI Diane  [facility] [place] ([facility] ) [phone]     Nov 13, 2023 11:15 AM  (Arrive by 11:00 AM)  Return Patient with ZURI Salguero  [facility] ([facility] ) [phone]

## 2023-10-07 NOTE — TELEPHONE ENCOUNTER
FUTURE VISIT INFORMATION      SURGERY INFORMATION:  Date: 10/17/23  Location: [facility] or  Surgeon:  Milan Broderick MD   Anesthesia Type:  general  Procedure: RIGHT THORACOSCOPIC LOWER LOBECTOMY     RECORDS REQUESTED FROM:         Pertinent Medical History: hypertension

## 2023-10-10 NOTE — TELEPHONE ENCOUNTER
Called and spoke with pt to let her know surgery date is now 10/24 with a 6am arrival. Pt agreed and understood.    Gloria Meadows on 10/10/2023 at 9:15 AM

## 2023-10-11 NOTE — H&P
Pre-Operative H & P     CC:  Preoperative exam to assess for increased cardiopulmonary risk while undergoing surgery and anesthesia.    Date of Encounter: 10/11/2023  Primary Care Physician:  Griffin Brian     Reason for visit:   Encounter Diagnoses   Name Primary?    Preop examination Yes    Malignant neoplasm of lower lobe of right lung (H)        HPI Linda J Mrozinski is a 73 year old female who presents for pre-operative H & P in preparation for  Procedure Information       Case: [id number] Date/Time: 10/24/23 0800    Procedure: RIGHT THORACOSCOPIC LOWER LOBECTOMY (Right: Chest)    Anesthesia type: General    Diagnosis: Malignant neoplasm of lower lobe of right lung (H) [C34.31]    Pre-op diagnosis: Malignant neoplasm of lower lobe of right lung (H) [C34.31]    Location:  OR  /  OR    Providers: Milan Broderick MD          History is obtained from the patient and chart review    Patient who was referred to Dr. Broderick and team for evaluation of a right lower lobe lung mass, biopsy proven moderately differentiated adenocarcinoma. Per notes EBUS 11R and 7 found negative for malignancy. Her imaging was reviewed and she was counseled for surgical resection.     Patient's history is otherwise significant for class 3 obesity, HTN, asthma, gallstone pancreatitis, hypothyroidism, gout and depression     Hx of abnormal bleeding or anti-platelet use: Denies    Menstrual history: No LMP recorded. Patient is postmenopausal.     Past Medical History  Past Medical History:   Diagnosis Date    Asthma     Depression     Gallstone pancreatitis     Gastroesophageal reflux disease     Gout     HTN (hypertension)     Hypothyroidism     Malignant neoplasm of lower lobe of right lung (H)     Osteoarthritis        Past Surgical History  Past Surgical History:   Procedure Laterality Date    BRONCHOSCOPY RIGID OR FLEXIBLE W/TRANSENDOSCOPIC ENDOBRONCHIAL ULTRASOUND GUIDED N/A 9/28/2023    Procedure: BRONCHOSCOPY,  "WITH ENDOBRONCHIAL ULTRASOUND;  Surgeon: Miguel Mcdonald MD;  Location: [facility]    EYE SURGERY      JOINT REPLACEMENT Right 01/01/2011    right knee    LAPAROSCOPIC CHOLECYSTECTOMY N/A 03/18/2015    Procedure: CHOLECYSTECTOMY LAPAROSCOPIC;  Surgeon: Hussain Langford MD;  Location: [facility];  Service:     TUBAL LIGATION         Prior to Admission Medications  Current Outpatient Medications   Medication Sig Dispense Refill    albuterol (PROVENTIL HFA;VENTOLIN HFA) 90 mcg/actuation inhaler [ALBUTEROL (PROVENTIL HFA;VENTOLIN HFA) 90 MCG/ACTUATION INHALER] Inhale 1-2 puffs every 6 (six) hours as needed for wheezing or shortness of breath.      Budesonide-Formoterol Fumarate (SYMBICORT IN) Inhale 2 puffs into the lungs 2 times daily      cholecalciferol, vitamin D3, 1,000 unit tablet Take 2,000 Units by mouth every morning      cyclobenzaprine (FLEXERIL) 5 MG tablet Take 1 tablet (5 mg) by mouth nightly as needed for muscle spasms 30 tablet 0    ibuprofen (ADVIL/MOTRIN) 200 MG tablet Take 400 mg by mouth every 4 hours as needed for pain      latanoprost (XALATAN) 0.005 % ophthalmic solution [LATANOPROST (XALATAN) 0.005 % OPHTHALMIC SOLUTION] Administer 1 drop to both eyes bedtime.      levothyroxine (SYNTHROID, LEVOTHROID) 112 MCG tablet Take 112 mcg by mouth every morning      melatonin 5 MG tablet Take 5 mg by mouth nightly as needed for sleep      timolol maleate (TIMOPTIC) 0.5 % ophthalmic solution Place 1 drop into both eyes 2 times daily      psyllium (METAMUCIL/KONSYL) 58.6 % powder Take 1 teaspoonful by mouth daily as needed for constipation         Allergies  Allergies   Allergen Reactions    Indomethacin Headache    Penicillins Hives     Pt reports she has tolerated amoxicillin in the past (e.g. prior to a dental procedure in April 2015)    Amoxicillin \"ok\"    Piroxicam Nausea and Headache       Social History  Social History     Socioeconomic History    Marital status:  " "    Spouse name: Not on file    Number of children: Not on file    Years of education: Not on file    Highest education level: Not on file   Occupational History    Not on file   Tobacco Use    Smoking status: Never    Smokeless tobacco: Never    Tobacco comments:     quit in the 1970s   Vaping Use    Vaping Use: Former   Substance and Sexual Activity    Alcohol use: Yes     Comment: Alcoholic Drinks/day: \"a few/week\" March 2015    Drug use: Yes     Types: Marijuana     Comment: Uses prn    Sexual activity: Not on file   Other Topics Concern    Not on file   Social History Narrative    Not on file     Social Determinants of Health     Financial Resource Strain: Not on file   Food Insecurity: Not on file   Transportation Needs: Not on file   Physical Activity: Not on file   Stress: Not on file   Social Connections: Not on file   Interpersonal Safety: Not on file   Housing Stability: Not on file       Family History  Family History   Problem Relation Age of Onset    Cancer Mother         breast and lung    Heart Disease Father 52    Anesthesia Reaction No family hx of     Clotting Disorder No family hx of        Review of Systems  The complete review of systems is negative other than noted in the HPI or here.   Anesthesia Evaluation   Pt has had prior anesthetic. Type: General and MAC.    No history of anesthetic complications       ROS/MED HX  ENT/Pulmonary:     (+)     WALKER risk factors,  hypertension, obese,            Intermittent, asthma Last exacerbation: Rare use of albuterol,  Treatment: Inhaler prn and Inhaled steroids,              (-) tobacco use and recent URI   Neurologic:    (-) no seizures and no CVA   Cardiovascular: Comment: BP range in clinic 160-140s/80-90s. Is followed by PCP and has not been recommended to start meds    CTA 1/2023  CORONARY ARTERY CALCIFICATION: Minimal.       (+)  hypertension- -   -  - -                                 Previous cardiac testing   Echo: Date: Results:    Stress " "Test:  Date: Results:    ECG Reviewed:  Date: 2015 Results:  NSR  Cath:  Date: Results:   (-) taking anticoagulants/antiplatelets and arrhythmias   METS/Exercise Tolerance: >4 METS Comment: Walking 20+ minutes daily without exertional symptoms. Occasional shortness of breath on hills. Does housework, goes up and down stairs multiple times daily without issue.   Hematologic:    (-) history of blood clots and history of blood transfusion   Musculoskeletal: Comment: Gout    Bilateral knee surgeries  (+)  arthritis,             GI/Hepatic: Comment: History gallstone pancreatitis  Hepatic steatosis    (+)             liver disease,    (-) GERD   Renal/Genitourinary:  - neg Renal ROS     Endo:     (+)          thyroid problem, hypothyroidism,    Obesity,       Psychiatric/Substance Use:     (+) psychiatric history depression   Recreational drug usage: Cannabis.    Infectious Disease:  - neg infectious disease ROS     Malignancy:   (+) Malignancy, History of Lung.  Lung CA Active status post.      Other:  - neg other ROS          BP (!) 149/87 (BP Location: Right arm, Patient Position: Sitting, Cuff Size: Adult Large)   Pulse 51   Temp 97.7  F (36.5  C) (Oral)   Resp 16   Ht 1.575 m (5' 2\")   Wt 103 kg (227 lb)   SpO2 97%   BMI 41.52 kg/m      Physical Exam   Constitutional: Awake, alert, cooperative, no apparent distress, and appears stated age. Accompanied by .  Eyes: Pupils equal, round and reactive to light, extra ocular muscles intact, sclera clear, conjunctiva normal. Glasses on.  HENT: Normocephalic, oral pharynx with moist mucus membranes, good dentition. No goiter appreciated.   Respiratory: Clear to auscultation bilaterally, no crackles or wheezing. No cough or obvious dyspnea.  Cardiovascular: Regular rate and rhythm, normal S1 and S2, and no murmur noted. Carotids +2, no bruits. 1+ bilateral, nonpitting edema in lower extremities. Palpable pulses to radial  DP and PT arteries.   GI: Normal bowel " sounds, soft, non-distended, non-tender, no masses palpated, no hepatosplenomegaly.    Lymph/Hematologic: No cervical lymphadenopathy and no supraclavicular lymphadenopathy.  Genitourinary:  Deferred.  Skin: Warm and dry.  No rashes at anticipated surgical site.   Musculoskeletal: Mildly limited ROM of neck. There is no redness, warmth, or swelling of the joints. Gross motor strength is normal.    Neurologic: Awake, alert, oriented to name, place and time. Cranial nerves II-XII are grossly intact. Gait is mildly irregular.  Neuropsychiatric: Calm, cooperative. Normal affect.     Prior Labs/Diagnostic Studies   All labs and imaging personally reviewed   Lab Results   Component Value Date    WBC 7.2 10/01/2023     Lab Results   Component Value Date    RBC 4.85 10/01/2023     Lab Results   Component Value Date    HGB 14.4 10/01/2023     Lab Results   Component Value Date    HCT 44.0 10/01/2023     Lab Results   Component Value Date    MCV 91 10/01/2023     Lab Results   Component Value Date    MCH 29.7 10/01/2023     Lab Results   Component Value Date    MCHC 32.7 10/01/2023     Lab Results   Component Value Date    RDW 13.6 10/01/2023     Lab Results   Component Value Date     10/01/2023     Last Comprehensive Metabolic Panel:  Sodium   Date Value Ref Range Status   10/01/2023 142 135 - 145 mmol/L Final     Comment:     Reference intervals for this test were updated on 09/26/2023 to more accurately reflect our healthy population. There may be differences in the flagging of prior results with similar values performed with this method. Interpretation of those prior results can be made in the context of the updated reference intervals.      Potassium   Date Value Ref Range Status   10/01/2023 3.9 3.4 - 5.3 mmol/L Final   06/13/2022 4.2 3.5 - 5.0 mmol/L Final     Chloride   Date Value Ref Range Status   10/01/2023 101 98 - 107 mmol/L Final   06/13/2022 101 98 - 107 mmol/L Final     Carbon Dioxide (CO2)   Date Value  Ref Range Status   10/01/2023 28 22 - 29 mmol/L Final   2022 26 22 - 31 mmol/L Final     Anion Gap   Date Value Ref Range Status   10/01/2023 13 7 - 15 mmol/L Final   2022 12 5 - 18 mmol/L Final     Glucose   Date Value Ref Range Status   10/01/2023 103 (H) 70 - 99 mg/dL Final   2022 99 70 - 125 mg/dL Final     Urea Nitrogen   Date Value Ref Range Status   10/01/2023 11.4 8.0 - 23.0 mg/dL Final   2022 16 8 - 28 mg/dL Final     Creatinine   Date Value Ref Range Status   10/01/2023 0.64 0.51 - 0.95 mg/dL Final     GFR Estimate   Date Value Ref Range Status   10/01/2023 >90 >60 mL/min/1.73m2 Final   2020 >60 >60 mL/min/1.73m2 Final     GFR, ESTIMATED POCT   Date Value Ref Range Status   2023 >60 >60 mL/min/1.73m2 Final     Calcium   Date Value Ref Range Status   10/01/2023 9.4 8.8 - 10.2 mg/dL Final     Bilirubin Total   Date Value Ref Range Status   10/01/2023 0.9 <=1.2 mg/dL Final     Alkaline Phosphatase   Date Value Ref Range Status   10/01/2023 102 35 - 104 U/L Final     ALT   Date Value Ref Range Status   10/01/2023 8 0 - 50 U/L Final     Comment:     Reference intervals for this test were updated on 2023 to more accurately reflect our healthy population. There may be differences in the flagging of prior results with similar values performed with this method. Interpretation of those prior results can be made in the context of the updated reference intervals.       AST   Date Value Ref Range Status   10/01/2023 17 0 - 45 U/L Final     Comment:     Reference intervals for this test were updated on 2023 to more accurately reflect our healthy population. There may be differences in the flagging of prior results with similar values performed with this method. Interpretation of those prior results can be made in the context of the updated reference intervals.     Lipase 15    9/15/23   INR 0.98  PTT 25    23 TSH 0.89    EK Normal sinus rhythm    10/1/23 CT Chest  PE                                                                IMPRESSION:  1.  No evidence for pulmonary embolism. No acute abnormality identified.  2.  Slightly larger solid nodular lesion at the right lower lobe compared to 08/16/2023. Mild progression of a neoplasm is possible. There are stable several bilateral small pulmonary nodules. Recommend follow-up CT chest in three months.  3.  A few incidental stable findings as above.  4.  Prominent stool at the proximal colon.    9/17/23 CT Head                                                                 IMPRESSION:    1.  No evidence of acute intracranial hemorrhage or mass effect.  2.  No CT evidence of intracranial metastases.  3.  Mild nonspecific white matter changes.  4.  Mild brain parenchymal volume loss.    1/26/23 CTA CAP  FINDINGS:   CT ANGIOGRAM CHEST, ABDOMEN, AND PELVIS: No evidence of an aortic aneurysm, intramural hematoma or dissection. Great vessels and mesenteric branches are well opacified. Mild atherosclerotic calcification throughout. No pulmonary emboli.     LUNGS AND PLEURA: There is an elliptical 2.7 x 1.7 low dense, pleural-based opacity in the right lower lobe posteriorly. There is motion artifact on the postcontrast study. Calcified granulomas in the left lower lobe.  Minimal fibroatelectasis in the left base. No effusions.     MEDIASTINUM/AXILLAE: Numerous calcified mediastinal left hilar and subcarinal nodes.     CORONARY ARTERY CALCIFICATION: Minimal.     HEPATOBILIARY: Prior cholecystectomy. Numerous calcified granulomas. Mild, diffuse fatty infiltration of the liver.     PANCREAS: Diffuse fatty atrophy of the pancreas.     SPLEEN: Numerous calcified granulomas.     ADRENAL GLANDS: Normal.     KIDNEYS/BLADDER: Normal.     BOWEL: Redundant colon with moderate stool burden. Scattered diverticuli. No inflammatory changes.     LYMPH NODES: Calcified granulomas in the tea hepatis. No suspicious adenopathy.     PELVIC ORGANS:  Calcified right fundal fibroid.     MUSCULOSKELETAL: Moderate facet arthropathy in the lower lumbar spine. No suspicious lesions.         Latest Ref Rng & Units 9/8/2023    12:58 PM   PFT   FVC L 2.36    FEV1 L 1.54    FVC% % 98    FEV1% % 81          The patient's records and results personally reviewed by this provider.     Outside records reviewed from: Care Everywhere    LAB/DIAGNOSTIC STUDIES TODAY:  Type and screen    Assessment    Linda J Mrozinski is a 73 year old female seen as a PAC referral for risk assessment and optimization for anesthesia.    Plan/Recommendations  Pt will be optimized for the proposed procedure.  See below for details on the assessment, risk, and preoperative recommendations    NEUROLOGY  - No history of TIA, CVA or seizure  - Pain right back. Flexeril prn at HS for spasm.  -Post Op delirium risk factors:  Age    ENT  - No current airway concerns.  Will need to be reassessed day of surgery.  Mallampati: I  TM: > 3  Mildly limited neck extension    CARDIAC  No known cardiac history, symptoms or meds. BP range in clinic 160-140/90-80s. Reports followed by PCP and has not been recommended medication. Denies chest pain or irregular HR. Has been exercising, walking distance for 20+ min daily. [place] area where [place] are lots of [place]. Reports occasional shortness of breath on hills. Also is doing housework, laundry, climbing multiple stairs in home without exertional symptoms. CTA CAP completed on 1/26/23 showing minimal coronary calcification.   - METS (Metabolic Equivalents)>4    RCRI: 0.9% risk of serious cardiac events    PULMONARY  Asthma, thought to be allergy induced. Good control. Will use Symbicort on DOS. Will bring albuterol on DOS. No recent exacerbation.   PFT: FEV1 1.54L (81%) DLCO 109%  Able to lie flat without difficulty  Intermediate risk for WALKER    - Tobacco History    History   Smoking Status    Never   Smokeless Tobacco    Never       GI: Denies GERD  Hepatic  "steatosis  LFTs normal  PONV Medium Risk  Total Score: 2           1 AN PONV: Pt is Female    1 AN PONV: Patient is not a current smoker        /RENAL  - Baseline Creatinine  0.64    ENDOCRINE    - BMI: Estimated body mass index is 41.52 kg/m  as calculated from the following:    Height as of this encounter: 1.575 m (5' 2\").    Weight as of this encounter: 103 kg (227 lb).  Class 3 Obesity (BMI > 40)  - No history of Diabetes Mellitus  Hypothyroidism. Will take Synthroid on DOS.    HEME: VTE risk 3%  Denies personal or family history of blood clots  Denies history of blood transfusion   Type and screen drawn today    MSK: Frailty score 0/5  OA s/p knee surgeries    PSYCH  - Depression. No meds at this time.     Enhanced Recovery Pathway initiated. Did not order Celebrex due to allergy contraindication     Different anesthesia methods/types have been discussed with the patient, but they are aware that the final plan will be decided by the assigned anesthesia provider on the date of service.      The patient is optimized for their procedure. AVS with information on surgery time/arrival time, meds and NPO status given by nursing staff. No further diagnostic testing indicated.      On the day of service:     Prep time: 14 minutes  Visit time: 15 minutes  Documentation time: 14 minutes  ------------------------------------------  Total time: 43 minutes      ZURI Diane CNS  Preoperative Assessment Center  [facility]  Clinic and [facility] Center  Phone: [phone]  Fax: [phone]    "

## 2023-10-11 NOTE — TELEPHONE ENCOUNTER
Oncology Nurse Triage - Pain    Situation: [place] reporting the following symptoms: Pain    Background:   Treating Provider:   Dr. Broderick    Date of last office visit: 10/5/23 Soni KEATING    Recent Treatments:scheduled for surgery on 10/24/23.     Assessment:     Location: right side ribs/toward back  Onset ongoing  Duration/Frequency:intermittent  Rates: when happens rates 7/10,   Quality: sharp stick poking into pt. Wakes pt up at night, difficulty sleeping.   Current med(s) used:Takes OTC Tylenol and IBUprofen and uses Flexeril which pt has taken today.   Hesitant to take oxycodone  that was prescribed by ED related to side effects, pt feels like in a fog most of day when takes oxy. Wondering for alternative to try to see if less side effects and to bridge for pain relief until surgery.       Denies fevers/chills, cough, sore throat, chest pain, SOB, headache, n/v, bowel & bladder issues.    [facility] pharmacy [facility]    Recommendations:   Routed high priority to thoracic team    1505 Per Soni,at last provider visit with thoracic team, pt was  instructed to use lidocaine patches, heat and ibuprofen.  If that combination isn't working, she needs to be assessed by her PCP. Unfortunately, because she hasn't had surgery yet, thoracic providers can not prescribe her any pain medication.    This writer called and relayed information to Pt. Asked pt to repeat back info/plan.  Pt was able to reverbalize understanding.    Instructed patient to seek care immediately for worsening symptoms, including: fever, chest pain, shortness of breath, dizziness.

## 2023-10-11 NOTE — PATIENT INSTRUCTIONS
Preparing for Your Surgery      Name:  Linda J Mrozinski   MRN:  [id number]   :  1950   Today's Date:  10/11/2023       Arriving for surgery:  Surgery date:  10/24/23  Arrival time:  6:00 am  Surgery time: 8:00 am    Please come to:     Please come to:      [facility] [address]  [address] [place]  [address]      The [facility] Patient /Visitor Ramp is located at [address]. Patients and visitors who self-park will receive the reduced hospital parking rate. If the Patient /Visitor Ramp is full, please follow the signs to the  parking located at the main hospital entrance.     parking is available ( 24 hours/ 7 days a week)    Discounted parking pass options are available for patients and visitors. They can be purchased at the [employer] desk at the main hospital entrance.    -    Stop at the security desk and they will direct surgery patients to the 3rd floor Surgery Waiting Room. [phone]     -  If you are in need of directions, wheelchair or escort please stop at the Information/security desk in the lobby.       What can I eat or drink?  -  You may eat and drink normally up to 8 hours prior to arrival time. (Until 10:00 pm on 10/23/23)  -  You may have clear liquids until 2 hours prior to arrival time. (Until 4:00 am on 10/24/23)    Examples of clear liquids:  Water  Clear broth  Juices (apple, white grape, white cranberry  and cider) without pulp  Noncarbonated, powder based beverages  (lemonade and Eris-Aid)  Sodas (Sprite, 7-Up, ginger ale and seltzer)  Coffee or tea (without milk or cream)  Gatorade    -  No Alcohol or cannabis products for at least 24 hours before surgery.     Which medicines can I take?    Hold Aspirin for 7 days before surgery.   Hold Multivitamins for 7 days before surgery.  Hold Supplements for 7 days before surgery.  Hold Ibuprofen (Advil, Motrin) for 1 day(s) before  surgery--unless otherwise directed by surgeon.  Hold Naproxen (Aleve) for 4 days before surgery.    -  DO NOT take these medications the day of surgery:  Vitamin D    -  PLEASE TAKE these medications per your usual routine:  Albuterol inhaler if needed and bring this to the hospital  Symbicort inhaler and bring this to the hospital  Cyclobenzaprine (Flexeril) if needed  Latanoprost (Xalatan) eye drops and bring this to the hospital  Levothyroxine  Melatonin  Timolol (Timoptic) eye drops and bring this to the hospital        Enhanced Recovery After Surgery - start 1&2 now, start 3 the day before surgery     This is a team effort, including you, to get you back on your feet, eating and drinking      normally and out of the hospital as quickly as possible.  The goals are: 1) NO INFECTIONS and   2) RETURN TO NORMAL DIET    How can we achieve these goals?  1) STAY ACTIVE: Walk every day before your surgery; try to increase the amount every day.  Walk after surgery as much as you can-the nurses will help you.  Walking speeds healing and gets you home quicker, you heal better at home and have less risk of infection.     2) INCENTIVE SPIROMETER: Practice your incentive spirometer 4 times per day with 5 repetitions each time.  Using the incentive spirometer can strengthen your muscles between your ribs and help you have a strong cough after surgery.  A more effective cough can help prevent problems with your lungs.    3) STAY HYDRATED: Drink clear liquids up until 2 hours prior to arrival. We would like you to purchase a drink such as Gatorade or Ensure Clear (not the milkshake type).  Drink this before bedtime and the morning of surgery, drink between 8-10 ounces or until you feel hydrated.  Keeping well hydrated leads to your veins being plump, you wake up faster, and you are less likely to be nauseated. Start drinking water as soon as you can after surgery and advance to clear liquids and food as tolerated.  IV fluids  contain salt, drinking fluids will minimize the amount of IV fluids you need and decrease the amount of salt you get.    The most common reason for the patient to be readmitted is dehydration. Staying hydrated after you go home from the hospital is very important.  Ensure or Ensure Clear are good options to keep you hydrated.     4) PAIN MANAGEMENT: If we minimize the amount of opioids and narcotics, and use regional blocks (which numb the area where your surgery is) along with oral pain medications; you will have less side effects of nausea and constipation. Narcotics can slow down your bowels and cause you to stay in the hospital longer.     Our goal is to keep you comfortable; eating and drinking normally and back home safely.     How do I prepare myself?  - Please take 2 showers (one the night prior to surgery and one the morning of surgery) using Scrubcare or Hibiclens soap.    Use this soap only from the neck to your toes. Do not use this soap on your genitals.     Leave the soap on your skin for one minute--then rinse thoroughly.      You may use your own shampoo and conditioner. No other hair products.   - Please remove all jewelry and body piercings.  - No lotions, deodorants or fragrance.  - No makeup or fingernail polish.   - Bring your ID and insurance card.    -If you have a Deep Brain Stimulator, Spinal Cord Stimulator, or any Neuro Stimulator device---you must bring the remote control to the hospital.      Covid testing policy as of 12/06/2022  Your surgeon will notify and schedule you for a COVID test if one is needed before surgery--please direct any questions or COVID symptoms to your surgeon      Questions or Concerns:    - For any questions regarding the day of surgery or your hospital stay, please contact the Pre Admission Nursing Office at [phone].       - If you have health changes between today and your surgery, please call your surgeon.       - For questions after surgery, please call  your surgeons office.           Current Visitor Guidelines    You may have 2 visitors in the pre op area.    Visiting hours: 8 a.m. to 8:30 p.m.    You may have four visitors during your inpatient hospital stay.    Patients confirmed or suspected to have symptoms of COVID 19 or flu:     No visitors allowed for adult patients.   Children (under age 18) can have 1 named visitor.     People who are sick or showing symptoms of COVID 19 or flu:    Are not allowed to visit patients--we can only make exceptions in special situations.       Please follow these guidelines for your visit:          Please maintain social distance          Masking is optional--however at times you may be asked to wear a mask for the safety of yourself and others     Clean your hands with alcohol hand . Do this when you arrive at and leave the building and patient room,    And again after you touch your mask or anything in the room.     Go directly to and from the room you are visiting.     Stay in the patient s room during your visit. Limit going to other places in the hospital as much as possible     Leave bags and jackets at home or in the car.     For everyone s health, please don t come and go during your visit. That includes for smoking   during your visit.

## 2023-10-23 NOTE — TELEPHONE ENCOUNTER
Received call from pt stating she received a call informing her her surgery start time is now pushed back to 3pm. Pt wanted to know why this was changed, because she may not have a ride for a later start. Writer informed pt the surgeon requested a later start time due to the case that has to go before hers. Pt understood and said she would look for an afternoon ride.    Gloria Meadows on 10/23/2023 at 11:41 AM

## 2023-10-23 NOTE — TELEPHONE ENCOUNTER
Received call from patient. Patient calling to inquire why her surgery time had been changed. Verbalized that she has already arranged transportation to the hospital to be there at 0600. Informed her that she will need to speak to our Surgery scheduler. Transferred her to Thoracic Surgery Scheduler.     Mita Hopkins RN, BSN  Thoracic Surgery RN Care Coordinator

## 2023-10-24 PROBLEM — C34.90 LUNG CANCER (H): Status: ACTIVE | Noted: 2023-01-01

## 2023-10-24 NOTE — ANESTHESIA CARE TRANSFER NOTE
Patient: Linda J Mrozinski    Procedure: Procedure(s):  RIGHT video assisted thoracic surgery LOWER LOBECTOMY       Diagnosis: Malignant neoplasm of lower lobe of right lung (H) [C34.31]  Diagnosis Additional Information: No value filed.    Anesthesia Type:   General     Note:    Oropharynx: oropharynx clear of all foreign objects  Level of Consciousness: drowsy  Oxygen Supplementation: face mask    Independent Airway: airway patency satisfactory and stable  Dentition: dentition unchanged  Vital Signs Stable: post-procedure vital signs reviewed and stable    Patient transferred to: PACU    Handoff Report: Identifed the Patient, Identified the Reponsible Provider, Reviewed the pertinent medical history, Discussed the surgical course, Reviewed Intra-OP anesthesia mangement and issues during anesthesia, Set expectations for post-procedure period and Allowed opportunity for questions and acknowledgement of understanding      Vitals:  Vitals Value Taken Time   /81 10/24/23 1819   Temp     Pulse 75 10/24/23 1825   Resp     SpO2 98 % 10/24/23 1825   Vitals shown include unfiled device data.    Electronically Signed By: ZURI Armijo CRNA  October 24, 2023  6:26 PM

## 2023-10-24 NOTE — ANESTHESIA POSTPROCEDURE EVALUATION
Patient: Linda J Mrozinski    Procedure: Procedure(s):  RIGHT video assisted thoracic surgery LOWER LOBECTOMY       Anesthesia Type:  General    Note:  Disposition: Admission   Postop Pain Control: Uneventful            Sign Out: Well controlled pain   PONV: No   Neuro/Psych: Uneventful            Sign Out: Acceptable/Baseline neuro status   Airway/Respiratory: Uneventful            Sign Out: Acceptable/Baseline resp. status   CV/Hemodynamics: Uneventful            Sign Out: Acceptable CV status; No obvious hypovolemia; No obvious fluid overload   Other NRE: NONE   DID A NON-ROUTINE EVENT OCCUR? No    Event details/Postop Comments:  No complications.           Last vitals:  Vitals Value Taken Time   /84 10/24/23 1836   Temp     Pulse 79 10/24/23 1841   Resp     SpO2 98 % 10/24/23 1841   Vitals shown include unfiled device data.    Electronically Signed By: Zac Valdes MD  October 24, 2023  6:42 PM

## 2023-10-24 NOTE — ANESTHESIA PREPROCEDURE EVALUATION
"Anesthesia Pre-Procedure Evaluation    Patient: Linda J Mrozinski   MRN: [id number] : 1950        Procedure : Procedure(s):  RIGHT THORACOSCOPIC LOWER LOBECTOMY          Past Medical History:   Diagnosis Date    Asthma     Depression     Gallstone pancreatitis     Gastroesophageal reflux disease     Gout     HTN (hypertension)     Hypothyroidism     Malignant neoplasm of lower lobe of right lung (H)     Osteoarthritis       Past Surgical History:   Procedure Laterality Date    BRONCHOSCOPY RIGID OR FLEXIBLE W/TRANSENDOSCOPIC ENDOBRONCHIAL ULTRASOUND GUIDED N/A 2023    Procedure: BRONCHOSCOPY, WITH ENDOBRONCHIAL ULTRASOUND;  Surgeon: Miguel Mcdonald MD;  Location: [facility]    EYE SURGERY      JOINT REPLACEMENT Right 2011    right knee    LAPAROSCOPIC CHOLECYSTECTOMY N/A 2015    Procedure: CHOLECYSTECTOMY LAPAROSCOPIC;  Surgeon: Hussain Langford MD;  Location: [facility];  Service:     TUBAL LIGATION        Allergies   Allergen Reactions    Indomethacin Headache    Penicillins Hives     Pt reports she has tolerated amoxicillin in the past (e.g. prior to a dental procedure in 2015)    Amoxicillin \"ok\"    Piroxicam Nausea and Headache      Social History     Tobacco Use    Smoking status: Never    Smokeless tobacco: Never    Tobacco comments:     quit in the 1970s   Substance Use Topics    Alcohol use: Yes     Comment: Alcoholic Drinks/day: \"a few/week\" 2015      Wt Readings from Last 1 Encounters:   10/24/23 100.1 kg (220 lb 10.9 oz)        Anesthesia Evaluation   Pt has had prior anesthetic. Type: MAC and General.        ROS/MED HX  ENT/Pulmonary: Comment: The patient has a right sided lung mass.    (+)                     asthma                  Neurologic:  - neg neurologic ROS     Cardiovascular:     (+)  hypertension- -   -  - -                                      METS/Exercise Tolerance:     Hematologic:  - neg hematologic  ROS   " "  Musculoskeletal:  - neg musculoskeletal ROS     GI/Hepatic:     (+) GERD,            liver disease,       Renal/Genitourinary:       Endo:     (+)          thyroid problem,     Obesity,       Psychiatric/Substance Use:  - neg psychiatric ROS     Infectious Disease:       Malignancy: Comment: Right lower lobe lung mass.      Other: Comment: Bilateral knee replacement and BSO.           Physical Exam    Airway  airway exam normal      Mallampati: I   TM distance: > 3 FB   Neck ROM: full   Mouth opening: > 3 cm    Respiratory Devices and Support         Dental       (+) Minor Abnormalities - some fillings, tiny chips      Cardiovascular   cardiovascular exam normal       Rhythm and rate: regular and normal     Pulmonary   pulmonary exam normal        breath sounds clear to auscultation           OUTSIDE LABS:  CBC:   Lab Results   Component Value Date    WBC 7.2 10/01/2023    HGB 14.4 10/01/2023    HGB 15.6 09/15/2023    HCT 44.0 10/01/2023     10/01/2023     09/15/2023     BMP:   Lab Results   Component Value Date     10/01/2023     06/12/2023    POTASSIUM 3.9 10/01/2023    POTASSIUM 4.3 06/12/2023    CHLORIDE 101 10/01/2023    CHLORIDE 107 06/12/2023    CO2 28 10/01/2023    CO2 26 06/12/2023    BUN 11.4 10/01/2023    BUN 14.3 06/12/2023    CR 0.64 10/01/2023    CR 0.6 09/17/2023     (H) 10/01/2023     (H) 06/12/2023     COAGS:   Lab Results   Component Value Date    PTT 25 09/15/2023    INR 0.98 09/15/2023     POC: No results found for: \"BGM\", \"HCG\", \"HCGS\"  HEPATIC:   Lab Results   Component Value Date    ALBUMIN 4.1 10/01/2023    PROTTOTAL 7.8 10/01/2023    ALT 8 10/01/2023    AST 17 10/01/2023    ALKPHOS 102 10/01/2023    BILITOTAL 0.9 10/01/2023     OTHER:   Lab Results   Component Value Date    ANTONY 9.4 10/01/2023    LIPASE 15 10/01/2023    TSH 0.89 06/12/2023       Anesthesia Plan    ASA Status:  4       Anesthesia Type: General.     - Airway: ETT   Induction: " Intravenous.   Maintenance: Inhalation.   Techniques and Equipment:     - Airway: Double lumen ETT     - Lines/Monitors: 2nd IV, Arterial Line     Consents    Anesthesia Plan(s) and associated risks, benefits, and realistic alternatives discussed. Questions answered and patient/representative(s) expressed understanding.     - Discussed: Risks, Benefits and Alternatives for BOTH SEDATION and the PROCEDURE were discussed     - Discussed with:  Patient      - Extended Intubation/Ventilatory Support Discussed: Yes.      - Patient is DNR/DNI Status: No     Use of blood products discussed: Yes.     - Discussed with: Patient.     Postoperative Care    Pain management: IV analgesics.   PONV prophylaxis: Ondansetron (or other 5HT-3), Dexamethasone or Solumedrol     Comments:    Other Comments: Possible central line if we are unable to obtain good IV access.  The material risks, benefits, and alternatives were discussed in detail.  The patient agrees to proceed.  The patient has no other complaints at this time.            Zac Valdes MD

## 2023-10-24 NOTE — ANESTHESIA PROCEDURE NOTES
Airway       Patient location during procedure: OR       Procedure Start/Stop Times: 10/24/2023 12:12 PM  Staff -        Anesthesiologist:  Zac Valdes MD       CRNA: Alicia Soriano APRN CRNA       Performed By: CRNA and anesthesiologistIndications and Patient Condition       Indications for airway management: arnaud-procedural       Induction type:intravenous       Mask difficulty assessment: 1 - vent by mask    Final Airway Details       Final airway type: endotracheal airway       Successful airway: ETT - double lumen left  Endotracheal Airway Details        Cuffed: yes       Successful intubation technique: video laryngoscopy       VL Blade Size: MAC 4       Grade View of Cords: 1       Adjucts: stylet       Position: Right       Measured from: gums/teeth       Secured at (cm): 27       Bite block used: None       ETT Double lumen (fr): 35    Post intubation assessment        Placement verified by: capnometry, equal breath sounds and chest rise        Number of attempts at approach: 1       Number of other approaches attempted: 0       Secured with: pink tape       Ease of procedure: easy       Dentition: Intact and Unchanged    Medication(s) Administered   Medication Administration Time: 10/24/2023 12:12 PM    Additional Comments       Placement verified with fiberoptic.

## 2023-10-24 NOTE — BRIEF OP NOTE
[facility]    Brief Operative Note    Pre-operative diagnosis: Malignant neoplasm of lower lobe of right lung (H) [C34.31]  Post-operative diagnosis Same as pre-operative diagnosis    Procedure: RIGHT video assisted thoracic surgery LOWER LOBECTOMY, Right - Chest    Surgeon: Surgeon(s) and Role:     * Milan Broderick MD - Primary     * Terrance Riddle MD - Resident - Assisting     * Charo Sher MD - Fellow - Assisting  Anesthesia: General   Estimated Blood Loss: 300 ml    Drains: 24 F chest tube to the right chest   Specimens:   ID Type Source Tests Collected by Time Destination   1 : Level 7 Tissue Lymph Node(s) SURGICAL PATHOLOGY EXAM Milan Broderick MD 10/24/2023  1:57 PM    2 : 11 R Tissue Lymph Node(s) SURGICAL PATHOLOGY EXAM Milan Broderick MD 10/24/2023  2:33 PM    3 : 11 R Tissue Lymph Node(s) SURGICAL PATHOLOGY EXAM Milan Broderick MD 10/24/2023  2:36 PM    4 : 11 R number 2 Tissue Lymph Node(s) SURGICAL PATHOLOGY EXAM Milan Broderick MD 10/24/2023  2:45 PM    5 : left lower lobe bronchial margin for frozen Tissue Lung, Lower Lobe, Left SURGICAL PATHOLOGY EXAM Milan Broderick MD 10/24/2023  3:45 PM    6 : 4R Tissue Lymph Node(s) SURGICAL PATHOLOGY EXAM Milan Broderick MD 10/24/2023  4:19 PM    7 : 2R Tissue Lymph Node(s) SURGICAL PATHOLOGY EXAM Milan Broderick MD 10/24/2023  4:34 PM      Findings:   None.  Complications: None.  Implants: * No implants in log *        Water seal

## 2023-10-25 NOTE — PHARMACY-ADMISSION MEDICATION HISTORY
Pharmacist Admission Medication History    Admission medication history is complete. The information provided in this note is only as accurate as the sources available at the time of the update.    Information Source(s): Patient via in-person    Pertinent Information:   - Patient was a reliable historian able to confirm all medication names, strengths, frequencies, and last doses.       Changes made to PTA medication list:  Added:   Acetaminophen  Oxycodone: Patient reports having at home from a recent ED visit and has taken recently.   Deleted: None  Changed:   Updated Symbicort with strength of medications 160-4.5    Allergies reviewed with patient and updates made in EHR: yes    Medication History Completed By: Castillo Goode [facility] 10/25/2023 12:14 PM    Prior to Admission medications    Medication Sig Last Dose Taking? Auth Provider Long Term End Date   acetaminophen (TYLENOL) 500 MG tablet Take 10,000 mg by mouth every 6 hours as needed for mild pain Past Month Yes Unknown, Entered By History     albuterol (PROVENTIL HFA;VENTOLIN HFA) 90 mcg/actuation inhaler Inhale 1-2 puffs into the lungs every 6 hours as needed for shortness of breath or wheezing Past Month Yes Provider, Historical     budesonide-formoterol (SYMBICORT) 160-4.5 MCG/ACT Inhaler Inhale 2 puffs into the lungs 2 times daily 10/24/2023 at 0800 Yes Reported, Patient Yes    cholecalciferol, vitamin D3, 1,000 unit tablet Take 2,000 Units by mouth every morning 10/22/2023 at 0800 Yes Provider, Historical     cyclobenzaprine (FLEXERIL) 5 MG tablet Take 1 tablet (5 mg) by mouth nightly as needed for muscle spasms 10/23/2023 at 2230 Yes Billy Garcia, DO     ibuprofen (ADVIL/MOTRIN) 200 MG tablet Take 400 mg by mouth every 4 hours as needed for pain Past Month Yes Reported, Patient     latanoprost (XALATAN) 0.005 % ophthalmic solution [LATANOPROST (XALATAN) 0.005 % OPHTHALMIC SOLUTION] Administer 1 drop to both eyes bedtime. 10/23/2023 at 2230 Yes Provider,  Historical     levothyroxine (SYNTHROID, LEVOTHROID) 112 MCG tablet Take 112 mcg by mouth every morning 10/24/2023 at 0730 Yes Provider, Historical     melatonin 5 MG tablet Take 5 mg by mouth at bedtime 10/17/2023 at 2000 Yes Reported, Patient     oxyCODONE (ROXICODONE) 5 MG tablet Take 2.5 mg by mouth every 6 hours as needed for severe pain 10/23/2023 at 2000 Yes Unknown, Entered By History     psyllium (METAMUCIL/KONSYL) 58.6 % powder Take 1 teaspoonful by mouth daily 10/22/2023 at 0800 Yes Reported, Patient     timolol maleate (TIMOPTIC) 0.5 % ophthalmic solution Place 1 drop into both eyes 2 times daily 10/24/2023 at 0800 Yes Reported, Patient

## 2023-10-25 NOTE — PROVIDER NOTIFICATION
7B [id number] Linda Mrozinski, Norco 5-325mg every 4 hours PRN was added to PRN's.Tylenol scheduled 975 mg every 8 hours. This may exceed 4 gram daily limit for Tylenol. Do you want to d/c Tylenol?    Yris SANCHEZ RN  [phone]    Paged: Roberto Sen MD

## 2023-10-25 NOTE — PROGRESS NOTES
"  Thoracic Surgery Progress Note  Surgery Cross-Cover  Post Op Check    10/25/2023    Linda J Mrozinski is a 73 year old female POD#0 s/p Procedure(s):  RIGHT video assisted thoracic surgery LOWER LOBECTOMY for Pre-Op Diagnosis Codes:     * Malignant neoplasm of lower lobe of right lung (H) [C34.31]    Pt reports pain near the incision site and deep pain in the right lower chest since waking up after surgery and hurts when she takes deep breaths. Denies nausea, SOB, or dizziness. Patient is not passing flatus or having bowel movements. Patient has not voided yet.     /69 (BP Location: Right arm, Patient Position: Semi-Matthews's, Cuff Size: Adult Regular)   Pulse 73   Temp 97.4  F (36.3  C) (Oral)   Resp 19   Ht 1.575 m (5' 2\")   Wt 100.1 kg (220 lb 10.9 oz)   SpO2 94%   BMI 40.36 kg/m      Gen: A&O x4, NAD   Chest: breathing non-labored on nasal cannula at 2 liters per minute   Abdomen: soft, non-tender, non-distended  Incision: clean, dry, intact. Bruising around the incision site but no active bleeding  Extremities: warm and well perfused  Devices: right chest tube on water seal. Minimal chest tube output    A/P: Discussed with RN to use PRN pain meds, Encouraged the patient to use IS, pass urine. Continue plan of care per primary team. Please call with any questions.    Gilda Mcintosh MD   "

## 2023-10-25 NOTE — OP NOTE
Preoperative diagnosis: Adenocarcinoma of the RLL (cT1cN0)  Postoperative diagnosis: Same as preop diagnosis  Procedure:   Flexible bronchoscopy  Uniportal thoracoscopic RIGHT lower lobectomy   Mediastinal lymphadenectomy  Intercostal muscle flap reinforcement of the bronchuas stump  Anesthesia: General  Surgeon: Milan Broderick (present and participated in the entire procedure)  Resident surgeon: Terrance Hopkins  EBL: 300 ml  Complications: None  Findings:  Bronchoscopy: initial exam normal  Thoracoscopy: difficult due to to extensive inflammation. There was also significant fibrosis just underneath the sj and in the area of 10R. For this reason, we only took 2 LN from the subcarinal space and did not dissect 10R.  At the end of the procedure we spent 60 min controlling hemostasis from all the hilar inflammation.  Frozen section: bronchial  margin negative   Patient tolerance: Procedure tolerated well and without complication      Description of procedure  We first performed a bronchoscopy to verify double-lumen endotracheal tube position and to examine the airways with the above-mentioned findings.  We then secured Linda J Mrozinski in the LEFT lateral position and the RIGHT chest was prepared and draped.   We made a single 4 cm access incision in the 5th intercostal space and split the chest wall muscles. We then incised the intercostal muscle along the rib for ~15 cm and placed a wound protector.  We then clearly dissected and identified the bronchovascular structures of the RIGHT lower lobe by performing a thorough lymph node dissection in the fissure, and we divided the fissure with an energy device (the fissure was complete). Next, we dissected the lower lobe PA circumferentially and transected it with ligatures (tie and stick-tie) after clearly identifying the RML artery. We then dissected the RIGHT lower lobe bronchus free and clearly identified the RML bronchus. Prior to transection with a  knife and scissors we performed a bronchoscopy to verify that the RML bronchus was patent. We sutured the stump with 2 U-shaped 3-0 vicryl stitches. We then transected the RIGHT lower lobe vein with ligatures (tie, stick tie, and a hem-o-lock clip). Finally, we placed the specimen in a plastic retrieval bag and removed it.    We performed an extensive mediastinal lymphadenectomy of lymph node stations 9R, 7, 4R, and 2R.    We verified that the bronchus stump was not leaking with insufflation (30 cmH2O) under immersion.     There was significant oozing around the bronchial stump and perihilar tissue, and we took 60 minutes to control it. We then mobilized an intercostal muscle flap and laid it over the bronchial stump and secured it to the stump with 2 vicryl stitches.     At the end of the procedure, we verified that the lung insufflated well and that the orientation of the remaining lung was appropriate. The fissure between the right middle lobe and right upper lobe was incomplete and we did not have to tack the middle lobe to the lower lobe  .     We then placed a 24 Swazi chest tube and closed with absorbable sutures.

## 2023-10-25 NOTE — PLAN OF CARE
"/56 (BP Location: Right arm, Patient Position: Semi-Matthews's, Cuff Size: Adult Regular)   Pulse 70   Temp 98.2  F (36.8  C) (Oral)   Resp 17   Ht 1.575 m (5' 2\")   Wt 100.1 kg (220 lb 10.9 oz)   SpO2 96%   BMI 40.36 kg/m      Patient slept well through the night. Right chest tube to water seal-no air leak. Pain 7-10/10, managed with PRN robaxin, PRN IV dilaudid and PRN [place]. 2 LPM O2 NC. Voiding spontaneously-saving. Bladder scan 31 mL. Not passing flatus, no BM. Ambulated to bathroom. Assist of 1. A&O x 4. PIV infusing IVF 75 mL/hr.       Goal Outcome Evaluation: Ongoing.       Plan of Care Reviewed With: patient    Overall Patient Progress: no change      Admitted/transferred from: PACU   2 RN full   skin assessment completed by Yris Traore, SAGAR and Dina Sam RN.  Skin assessment finding: issues found Right CT, PIV x 2. Scattered bruising.     Interventions/actions: other Encouraged frequent repositioning, toileting and repositioning.       Bedside Emergency Equipment Present:  Suction Regulator: Yes  Suction Canister: Yes  Tubing between Regulator and Canister: Yes  O2 Regulator with Tree: Yes  Ambu Bag: Yes     "

## 2023-10-25 NOTE — PROGRESS NOTES
THORACIC & FOREGUT SURGERY    S:  No overnight events.  Pt seen at bedside resting comfortably.   Eager for dispo when appropriate.    O:  Vitals:    10/24/23 2211 10/24/23 2234 10/25/23 0300 10/25/23 0739   BP: 132/67 122/69 116/56 118/71   BP Location: Right arm Right arm Right arm Right arm   Patient Position: Semi-Matthews's Semi-Matthews's Semi-Matthews's    Cuff Size: Adult Regular Adult Regular Adult Regular    Pulse: 73 73 70 82   Resp: 19 19 17 16   Temp:   98.2  F (36.8  C) 98.3  F (36.8  C)   TempSrc:   Oral Oral   SpO2: 94% 94% 96% 94%   Weight:       Height:           A&Ox3, NAD  Breathing non-labored  Noncyanotic  NDNT  Distal extremities warm    Thick serosang output, no air leak    A/P: Linda J Mrozinski is a 73 year old female POD#1 s/p RIGHT video assisted thoracic surgery LOWER LOBECTOMY.  Doing well.  -Chest tube to water seal  -ADAT  -likely chest tube removal and dispo tomorrow if feeling well  -Lovenox    POSTOP COMPLICATIONS: none     Jaxson Benavides PA-C

## 2023-10-25 NOTE — PROGRESS NOTES
10/25/23 0800   Appointment Info   Signing Clinician's Name / Credentials (OT) Lisa Roy OTR/L   Rehab Comments (OT) thoracotomy, OT only       Present no   Language english   Living Environment   People in Home spouse   Current Living Arrangements house   Home Accessibility stairs to enter home;stairs within home   Number of Stairs, Main Entrance 2   Stair Railings, Main Entrance railings safe and in good condition   Number of Stairs, Within Home, Primary greater than 10 stairs  (14)   Stair Railings, Within Home, Primary railings safe and in good condition   Transportation Anticipated car, drives self   Living Environment Comments Pt lives in a split level house with her . Pt has 14 stairs to get to the main leve   Self-Care   Usual Activity Tolerance good   Current Activity Tolerance moderate   Regular Exercise Yes   Activity/Exercise Type walking   Exercise Amount/Frequency 20 mins   Equipment Currently Used at Home none   Fall history within last six months no   Activity/Exercise/Self-Care Comment Pt was previously independent with ADL completion   Instrumental Activities of Daily Living (IADL)   Previous Responsibilities meal prep;housekeeping;laundry;shopping;yardwork;medication management;finances;driving   IADL Comments Pt and spouse share IADL responsibilities   General Information   Onset of Illness/Injury or Date of Surgery 10/24/23   Referring Physician Terrance Riddle MD   Patient/Family Therapy Goal Statement (OT) To return home   Additional Occupational Profile Info/Pertinent History of Current Problem Linda J Mrozinski is a 73 year old female POD#0 s/p Procedure(s):  RIGHT video assisted thoracic surgery LOWER LOBECTOMY   Existing Precautions/Restrictions thoracotomy   Left Upper Extremity (Weight-bearing Status) other (see comments)  (15 lbs)   Right Upper Extremity (Weight-bearing Status) other (see comments)  (15 lbs)   Left Lower Extremity (Weight-bearing  Status) full weight-bearing (FWB)   Right Lower Extremity (Weight-bearing Status) full weight-bearing (FWB)   General Observations and Info Activity: ambulate with assist   Cognitive Status Examination   Orientation Status orientation to person, place and time   Cognitive Status Comments Pt is alert, oriented and appropriate in conversation   Visual Perception   Visual Impairment/Limitations WNL   Sensory   Sensory Quick Adds sensation intact   Pain Assessment   Patient Currently in Pain No   Range of Motion Comprehensive   General Range of Motion no range of motion deficits identified   Comment, General Range of Motion BUE ROM WFL   Strength Comprehensive (MMT)   General Manual Muscle Testing (MMT) Assessment no strength deficits identified   Comment, General Manual Muscle Testing (MMT) Assessment Per observation BUE grossly 5/5   Transfers   Transfers sit-stand transfer   Sit-Stand Transfer   Sit-Stand Slope (Transfers) supervision   Balance   Balance Comments Pt ambulated ~5ft with SBA   Activities of Daily Living   BADL Assessment/Intervention bathing;upper body dressing;lower body dressing;clothing fastener management;grooming;toileting   Bathing Assessment/Intervention   Slope Level (Bathing) minimum assist (75% patient effort)   Comment, (Bathing) Per clinical judgement   Upper Body Dressing Assessment/Training   Comment, (Upper Body Dressing) Per clinical judgement   Slope Level (Upper Body Dressing) minimum assist (75% patient effort)   Lower Body Dressing Assessment/Training   Comment, (Lower Body Dressing) Per clinical judgement   Slope Level (Lower Body Dressing) supervision   Grooming Assessment/Training   Slope Level (Grooming) supervision   Comment, (Grooming) Per clinical judgement   Toileting   Comment, (Toileting) Per clinical judgement   Slope Level (Toileting) supervision   Clinical Impression   Criteria for Skilled Therapeutic Interventions Met (OT) Yes,  treatment indicated   OT Diagnosis decreased activity tolerance and independence with ADL completion   Influenced by the following impairments weakness, fatigue, post surgical precautions   OT Problem List-Impairments impacting ADL activity tolerance impaired;problems related to;strength;post-surgical precautions   Assessment of Occupational Performance 1-3 Performance Deficits   Identified Performance Deficits UB dressing, stairs, functional mobility   Planned Therapy Interventions (OT) ADL retraining;IADL retraining;strengthening;ROM;home program guidelines;progressive activity/exercise   Clinical Decision Making Complexity (OT) problem focused assessment/low complexity   Risk & Benefits of therapy have been explained evaluation/treatment results reviewed;care plan/treatment goals reviewed;risks/benefits reviewed;current/potential barriers reviewed;participants voiced agreement with care plan;participants included;patient   OT Total Evaluation Time   OT Eval, Low Complexity Minutes (22057) 9

## 2023-10-25 NOTE — PLAN OF CARE
"Goal Outcome Evaluation:  /71 (BP Location: Right arm)   Pulse 82   Temp 98.3  F (36.8  C) (Oral)   Resp 16   Ht 1.575 m (5' 2\")   Wt 100.1 kg (220 lb 10.9 oz)   SpO2 94%   BMI 40.36 kg/m      Care from: 5973-0730    VS & Pain: VSS on RA. Pain controlled with scheduled meds and PRN norco  Neuro: A&Ox4, able to make needs known  Respiratory: WDL on RA   Cardiac: WDL  GI/: Voiding without difficulties. No gas or BM this shift  Nutrition: Tolerating diet   Skin: Scattered bruising, R CT site CDI  Lines: Chest tube set to WS with minimal sanguinous drainage, no air leak  Activity: Up multiple times to bathroom with assist x1. Worked with PT today    Plan: Call light within reach, continue with plan of care                        "

## 2023-10-25 NOTE — PROVIDER NOTIFICATION
7B 1962 [place] Mrozinski  Pt is requesting PO hydrocodone. States oxycodone makes her feel foggy.     Thank you  Yris SANCHEZ RN  [phone]    Paged: Gilda Mcintosh MD

## 2023-10-25 NOTE — PROVIDER NOTIFICATION
7B 7232-1 Linda Mrozinski,     Pt. post op BG this .     Thanks,   Yris SANCHEZ RN- Overnight nurse     Call back Claudia SANDOVAL Nurse: [phone]    Paged Thoracic Surgery

## 2023-10-25 NOTE — PLAN OF CARE
Physical Therapy: Orders received. Chart reviewed and discussed with care team.? Physical Therapy not indicated due to pt being IND and safe with mobility, ADLs, and self cares. Pt is at PLOF per OT.? Defer discharge recommendations to medical team.? Will complete orders.

## 2023-10-26 NOTE — PROGRESS NOTES
"Blood pressure 118/68, pulse 68, temperature 97.9  F (36.6  C), temperature source Oral, resp. rate 16, height 1.575 m (5' 2\"), weight 101 kg (222 lb 11.2 oz), SpO2 94%.    Activity: SBA to bathroom  Neuros:  AOX4, makes needs known  Cardiac: WDL  Respiratory: WDL  GI/: Voiding spontaneous, passing flatus, no BM  Diet: Regular, tolerating  Skin/Incisions/Drains: Lt/Rt PIV, scattered bruises  Lines: Lt/Rt PIV SL  Pain: Denies pain  Plan: Discharge today  "

## 2023-10-26 NOTE — DISCHARGE SUMMARY
NAME: Linda J Mrozinski   MRN: [id number]   : 1950     DATE OF ADMISSION: 10/24/2023     PRE/POSTOPERATIVE DIAGNOSES: Adenocarcinoma of the RLL (cT1cN0)     PROCEDURES PERFORMED:   Flexible bronchoscopy  Uniportal thoracoscopic RIGHT lower lobectomy   Mediastinal lymphadenectomy  Intercostal muscle flap reinforcement of the bronchuas stump    PATHOLOGY RESULTS: RESIDUAL ATYPICAL ADENOMATOUS HYPERPLASIA/ADENOCARCINOMA IN-SITU (lepidic growth pattern), measuring 0.5 cm in greatest dimension.  -No evidence of residual invasive malignancy identified.  -The neoplasm develops within a pulmonary scar that measures 3.2 cm in linear extent, associated with abscess cavity.  -Visceral pleura is not involved.     CULTURE RESULTS: None     INTRAOPERATIVE COMPLICATIONS: None     POSTOPERATIVE MEDICAL ISSUES: None     DRAINS/TUBES PRESENT AT DISCHARGE: dressing changes    DATE OF DISCHARGE:  10/26/2023    HOSPITAL COURSE: Linda J Mrozinski is a 73 year old female who on 10/24/2023 underwent the above-named procedures. She tolerated the operation well and postoperatively was transferred to the general post-surgical unit.  The remainder of her course was essentially uncomplicated.  Prior to discharge, her pain was controlled well, she was able to perform ADLs and ambulate independently without difficulty, and had full return of bladder function.  On 10/26, she was discharged home in stable condition with close follow up in place.    DISCHARGE EXAM:   A&O, NAD  Resp non-labored  Distal extremities warm    Incisions CDI  Chest tube site without concern     DISCHARGE INSTRUCTIONS:  Discharge Procedure Orders   Reason for your hospital stay   Order Comments: Surgery     Activity   Order Comments: Your activity upon discharge: activity as tolerated     Order Specific Question Answer Comments   Is discharge order? Yes      Discharge Instructions   Order Comments: THORACIC SURGERY DISCHARGE INSTRUCTIONS    DIET: Regular diet - as  "prior to admission     If your plans upon discharge include prolonged periods of sitting (i.e a lengthy car or plane ride), it is highly beneficial to get up and walk at least once per hour to help prevent swelling and blood clots.     You may remove chest tube dressing 48 hours after tube removal and bandage the site at your own discretion thereafter.  Small amounts of leakage are normal for 2-3 days after removal.  Feel free to call with questions.    You may get incision wet 2 days after operation. Do not submerge, soak, or scrub incision or swim until seen in follow-up.    Take incentive spirometer home for continued frequent use    Activity as tolerated, no strenous activity until seen in follow-up, no lifting greater than 20 pounds for the next 2 weeks.    Stay hydrated. Take over the counter fiber (metamucil or benefiber) and stool softeners (Miralax, docusate or senna) if becoming constipated.     Call for fever greater than 101.5, chills, increased size of incision, red skin around incision, vision changes, muscle strength changes, sensation changes, shortness of breath, or other concerns.    No driving while taking narcotic pain medication.    Transition to ibuprofen or tylenol/acetaminophen for pain control. Do not take tylenol/acetaminophen and acetaminophen containing narcotic (e.g., percocet or vicodin) at the same time. If you have known ulcer problems, or kidney trouble (elevated creatinine) do not take the ibuprofen.    In emergencies, call 911    For other Questions or Concerns;   A.) During weekday working hours (Monday through Friday 8am to 4:30pm)   call [phone] and ask to speak to a thoracic surgery nurse (RN or LPN).     B.) At nights (after 4:30pm), on weekends, or if urgent call [phone] and   tell the  \"I would like to page job code [phone], the thoracic surgery   fellow on call, please.\"     Adult [facility] Follow-up and recommended labs and tests   Order Comments: 1.) " Follow up with primary care physician, Griffin Brian, in 1-2 weeks.  2.) Follow up with Dr. Broderick in Thoracic Surgery clinic in 2 weeks, prior to which a CXR should be performed.       Appointments on [place] and/or [facility] (with [facility] or [facility] provider or service). Call [phone] if you haven't heard regarding these appointments within 7 days of discharge.     Diet   Order Comments: Follow this diet upon discharge: Orders Placed This Encounter      Regular Diet Adult     Order Specific Question Answer Comments   Is discharge order? Yes        DISCHARGE MEDICATIONS:   Current Discharge Medication List        START taking these medications    Details   enoxaparin ANTICOAGULANT (LOVENOX) 40 MG/0.4ML syringe Inject 0.4 mLs (40 mg) Subcutaneous every 24 hours for 4 days  Qty: 1.6 mL, Refills: 0    Associated Diagnoses: Malignant neoplasm of lower lobe of right lung (H)      HYDROcodone-acetaminophen (NORCO) 5-325 MG tablet Take 2 tablets by mouth every 4 hours as needed for severe pain  Qty: 40 tablet, Refills: 0    Associated Diagnoses: Malignant neoplasm of lower lobe of right lung (H)      methocarbamol (ROBAXIN) 500 MG tablet Take 1 tablet (500 mg) by mouth every 6 hours as needed for muscle spasms  Qty: 40 tablet, Refills: 0    Associated Diagnoses: Malignant neoplasm of lower lobe of right lung (H)      polyethylene glycol (MIRALAX) 17 g packet Take 17 g by mouth daily  Qty: 7 packet, Refills: 0    Associated Diagnoses: Malignant neoplasm of lower lobe of right lung (H)      senna-docusate (SENOKOT-S/PERICOLACE) 8.6-50 MG tablet Take 1 tablet by mouth 2 times daily  Qty: 14 tablet, Refills: 0    Associated Diagnoses: Malignant neoplasm of lower lobe of right lung (H)           CONTINUE these medications which have NOT CHANGED    Details   albuterol (PROVENTIL HFA;VENTOLIN HFA) 90 mcg/actuation inhaler Inhale 1-2 puffs into the lungs every 6 hours as needed for shortness of breath or wheezing       budesonide-formoterol (SYMBICORT) 160-4.5 MCG/ACT Inhaler Inhale 2 puffs into the lungs 2 times daily      cholecalciferol, vitamin D3, 1,000 unit tablet Take 2,000 Units by mouth every morning      cyclobenzaprine (FLEXERIL) 5 MG tablet Take 1 tablet (5 mg) by mouth nightly as needed for muscle spasms  Qty: 30 tablet, Refills: 0      ibuprofen (ADVIL/MOTRIN) 200 MG tablet Take 400 mg by mouth every 4 hours as needed for pain      latanoprost (XALATAN) 0.005 % ophthalmic solution [LATANOPROST (XALATAN) 0.005 % OPHTHALMIC SOLUTION] Administer 1 drop to both eyes bedtime.      levothyroxine (SYNTHROID, LEVOTHROID) 112 MCG tablet Take 112 mcg by mouth every morning      melatonin 5 MG tablet Take 5 mg by mouth at bedtime      oxyCODONE (ROXICODONE) 5 MG tablet Take 2.5 mg by mouth every 6 hours as needed for severe pain      psyllium (METAMUCIL/KONSYL) 58.6 % powder Take 1 teaspoonful by mouth daily      timolol maleate (TIMOPTIC) 0.5 % ophthalmic solution Place 1 drop into both eyes 2 times daily           STOP taking these medications       acetaminophen (TYLENOL) 500 MG tablet Comments:   Reason for Stopping:

## 2023-10-26 NOTE — PLAN OF CARE
Goal Outcome Evaluation:      Plan of Care Reviewed With: patient    Overall Patient Progress: improving    Outcome Evaluation: Pt A&Ox4, able to make needs known. VSS in RA. Regular diet, voiding with no difficulty. Faint BS, + passing gas this AM, no bm this shift. PRN milk of mag x1 given. CT to WS-removed by MD this morning. XR chest done this AM. Pain managed with NORCOx1 and tylenol x1 with relief this shift. No acute changes this shift, possible discharge today. Continue with POC

## 2023-10-26 NOTE — PLAN OF CARE
Occupational Therapy Discharge Summary    Reason for therapy discharge:    Discharged to home.    Progress towards therapy goal(s). See goals on Care Plan in [facility] electronic health record for goal details.  Goals partially met.  Barriers to achieving goals:   discharge from facility.    Therapy recommendation(s):    No further therapy is recommended.

## 2023-11-02 NOTE — TELEPHONE ENCOUNTER
Pt calling to report she is trying to wean off of hydromorphone since she feels it is causing some nausea. She is wondering how much ibuprofen she can take. Informed pt she can start take 600 mg of ibuprofen every 6hrs. Pt states she will take 400 mg every 6hrs and see if that is sufficient.

## 2023-11-03 NOTE — TELEPHONE ENCOUNTER
Called patient to follow up on conversation from yesterday.   Informed her that writer spoke with ZURI Abad and that Soni was aware that this pain was present before the nodule was identified. Explained to her that her surgical pain appears to be managed pretty well as she has denied incisional or neuropathic pain. Informed her that Soni recommends that she should see her PCP for this as it is not related to her surgical pain. Patient verbalized her understanding and stated that she will be calling her PCP this afternoon. Patient appreciated writer's call and follow up.    Patient reports no problems urinating and stated that she her bowels are more regular than normal.       Mita Hopkins RN, BSN  Thoracic Surgery RN Care Coordinator

## 2023-11-04 NOTE — ED TRIAGE NOTES
Right flank pain, mid back pain. Started Tuesday. States some SOB after surgery. Denies urgency, frequency, hesitancy urination. Had a mid right lobe lobectomy done at [facility] on the 24th due to mass on right lung. Denies light headedness, nor dizziness. Does state some nausea.

## 2023-11-04 NOTE — ED NOTES
Assisted pt from Waiting room to Room 1. Pt declined to have wheelchair transport. Appeared to be short of breath with ambulation. Rates pain at 7/10. Endorsed she took a prescribed muscle relaxer prior to ED arrival that didn't seem to help with the pain.

## 2023-11-04 NOTE — ED PROVIDER NOTES
EMERGENCY DEPARTMENT ENCOUNTER      NAME: Linda J Mrozinski  AGE: 73 year old female  YOB: 1950  MRN: [id number]  EVALUATION DATE & TIME: 11/3/2023 10:55 PM    PCP: Griffin Brian    ED PROVIDER: Ronen Cruz M.D.      Chief Complaint   Patient presents with    Flank Pain         FINAL IMPRESSION:  1. Hemopneumothorax on right    2. S/P lobectomy of lung          ED COURSE & MEDICAL DECISION MAKING:    Pertinent Labs & Imaging studies reviewed. (See chart for details)  73 year old female presents to the Emergency Department for evaluation of right-sided upper back pain.  This pain actually preceded her surgery.  This is why the begin the work-up.  Now having difficulty getting comfortable.  Did do an x-ray which does show pleural effusion with small pneumothorax.  Likely postoperative.  Discussed with thoracic surgery there would be no need for further intervention at this time.  Patient's oxygen saturation is normal.  Patient received ibuprofen Zofran and oxycodone with improvement of her symptoms feeling much more comfortable.  Also received the lidocaine patch.  I think this is likely her chronic pain.  I do not think this represents a more serious etiology.  No signs of infection.  I do not think this is ACS.  Patient be discharged home.  Has follow-up with thoracic surgery on Wednesday.  Will return for worsening symptoms    11:03 PM I met with the patient to gather history and to perform my initial exam. I discussed the plan for care while in the Emergency Department.       At the conclusion of the encounter I discussed the results of all of the tests and the disposition. The questions were answered. The patient or family acknowledged understanding and was agreeable with the care plan.     Medical Decision Making    History:  Supplemental history from: Documented in chart, if applicable  External Record(s) reviewed: Documented in chart, if applicable. and Inpatient Record: admission and  operative note from 10/24/23    Work Up:  Chart documentation includes differential considered and any EKGs or imaging independently interpreted by provider, where specified.  In additional to work up documented, I considered the following work up: Documented in chart, if applicable.    External consultation:  Discussion of management with another provider: Documented in chart, if applicable    Complicating factors:  Care impacted by chronic illness: Hypertension  Care affected by social determinants of health: Access to Medical Care    Disposition considerations: Discharge. I prescribed additional prescription strength medication(s) as charted. N/A.             MEDICATIONS GIVEN IN THE EMERGENCY:  Medications   ibuprofen (ADVIL/MOTRIN) tablet 400 mg (400 mg Oral $Given 11/3/23 2336)   ondansetron (ZOFRAN ODT) ODT tab 4 mg (4 mg Oral $Given 11/3/23 2336)   oxyCODONE (ROXICODONE) tablet 5 mg (5 mg Oral $Given 11/3/23 2336)       NEW PRESCRIPTIONS STARTED AT TODAY'S ER VISIT  Discharge Medication List as of 11/4/2023 12:43 AM        START taking these medications    Details   ondansetron (ZOFRAN ODT) 4 MG ODT tab Take 1 tablet (4 mg) by mouth every 8 hours as needed for nausea, Disp-10 tablet, R-0, Local Print                =================================================================    HPI    Patient information was obtained from: Patient    Linda J Mrozinski is a 73 year old female with a pertinent history of hypertension, lung cancer status post right lower lobectomy on 24 October who presents to this ED for evaluation of right-sided back pain.  This is the back pain that started in January of this year.  It is what preceded her lobectomy.  It is thought that this could have been done to her tumor.  Initially felt better but now having worsening back pain over the last few days.  Had been trying Vicodin at home but states it makes her nauseous.  No worsening shortness of breath.  Cough has improved since her  procedure.  No fever.  She has not thrown up.  Pain does not radiate.  No weakness or numbness in her arms or legs.  Describes this as a dull pain in her back.  She is not able to get comfortable.  She also tried Flexeril.  Last time she took any kind of pain medicine was 530 this morning.        PAST MEDICAL HISTORY:  Past Medical History:   Diagnosis Date    Asthma     Depression     Gallstone pancreatitis     Gastroesophageal reflux disease     Gout     HTN (hypertension)     Hypothyroidism     Malignant neoplasm of lower lobe of right lung (H)     Osteoarthritis        PAST SURGICAL HISTORY:  Past Surgical History:   Procedure Laterality Date    BRONCHOSCOPY RIGID OR FLEXIBLE W/TRANSENDOSCOPIC ENDOBRONCHIAL ULTRASOUND GUIDED N/A 9/28/2023    Procedure: BRONCHOSCOPY, WITH ENDOBRONCHIAL ULTRASOUND;  Surgeon: Miguel Mcdonald MD;  [place] [facility] [place]YE SURGERY      JOINT REPLACEMENT Right 01/01/2011    right knee    LAPAROSCOPIC CHOLECYSTECTOMY N/A 03/18/2015    Procedure: CHOLECYSTECTOMY LAPAROSCOPIC;  Surgeon: Hussain Langford MD;  Location: [facility];  Service:     THORACOSCOPIC LOBECTOMY LUNG Right 10/24/2023    Procedure: RIGHT video assisted thoracic surgery LOWER LOBECTOMY;  Surgeon: Milan Broderick MD;  Location: [facility] OR    TUBAL LIGATION             CURRENT MEDICATIONS:    No current facility-administered medications for this encounter.     Current Outpatient Medications   Medication    ondansetron (ZOFRAN ODT) 4 MG ODT tab    oxyCODONE (ROXICODONE) 5 MG tablet    albuterol (PROVENTIL HFA;VENTOLIN HFA) 90 mcg/actuation inhaler    budesonide-formoterol (SYMBICORT) 160-4.5 MCG/ACT Inhaler    cholecalciferol, vitamin D3, 1,000 unit tablet    cyclobenzaprine (FLEXERIL) 5 MG tablet    HYDROcodone-acetaminophen (NORCO) 5-325 MG tablet    ibuprofen (ADVIL/MOTRIN) 200 MG tablet    latanoprost (XALATAN) 0.005 % ophthalmic solution    levothyroxine (SYNTHROID, LEVOTHROID)  "112 MCG tablet    melatonin 5 MG tablet    methocarbamol (ROBAXIN) 500 MG tablet    polyethylene glycol (MIRALAX) 17 g packet    psyllium (METAMUCIL/KONSYL) 58.6 % powder    senna-docusate (SENOKOT-S/PERICOLACE) 8.6-50 MG tablet    timolol maleate (TIMOPTIC) 0.5 % ophthalmic solution         ALLERGIES:  Allergies   Allergen Reactions    Indomethacin Headache    Penicillins Hives     Pt reports she has tolerated amoxicillin in the past (e.g. prior to a dental procedure in April 2015)    Amoxicillin \"ok\"    Piroxicam Nausea and Headache       FAMILY HISTORY:  Family History   Problem Relation Age of Onset    Cancer Mother         breast and lung    Heart Disease Father 52    Anesthesia Reaction No family hx of     Clotting Disorder No family hx of        SOCIAL HISTORY:   Social History     Socioeconomic History    Marital status:    Tobacco Use    Smoking status: Never    Smokeless tobacco: Never    Tobacco comments:     quit in the 1970s   Vaping Use    Vaping Use: Former   Substance and Sexual Activity    Alcohol use: Yes     Comment: Alcoholic Drinks/day: \"a few/week\" March 2015    Drug use: Yes     Types: Marijuana     Comment: Uses prn       VITALS:  BP (!) 173/82   Pulse 62   Temp 97.4  F (36.3  C) (Oral)   Resp 18   Wt 98 kg (216 lb)   SpO2 93%   BMI 39.51 kg/m      PHYSICAL EXAM    Physical Exam  Vitals and nursing note reviewed.   Constitutional:       General: She is not in acute distress.     Appearance: She is not diaphoretic.   HENT:      Head: Atraumatic.      Mouth/Throat:      Pharynx: No oropharyngeal exudate.   Eyes:      General: No scleral icterus.     Pupils: Pupils are equal, round, and reactive to light.   Cardiovascular:      Rate and Rhythm: Normal rate and regular rhythm.      Heart sounds: Normal heart sounds.   Pulmonary:      Effort: No respiratory distress.      Breath sounds: Normal breath sounds.      Comments: Diminished breath sounds at the right base.  Abdominal:      " Palpations: Abdomen is soft.      Tenderness: There is no abdominal tenderness. There is no guarding or rebound. Negative signs include De La Vega's sign.   Musculoskeletal:         General: No tenderness.   Skin:     General: Skin is warm.      Findings: No rash.   Neurological:      General: No focal deficit present.      Mental Status: She is alert.           LAB:  All pertinent labs reviewed and interpreted.  Labs Ordered and Resulted from Time of ED Arrival to Time of ED Departure - No data to display    RADIOLOGY:  Reviewed all pertinent imaging. Please see official radiology report.  XR Chest 2 Views   Final Result   IMPRESSION: Heart size is normal. Interval development of moderate right pleural effusion, with associated small to moderate pneumothorax. Pneumothorax reaches 15 mm over the right lung apex. Associated basilar atelectasis. Left lung is clear. Persistent    right-sided subcutaneous emphysema. No mediastinal shift      Hydropneumothorax was called to Dr. Cruz by Dr. Jey Blanco on 11/4/2023 12:12 AM CDT.              Ronen Cruz M.D.  Emergency Medicine  [facility] EMERGENCY ROOM  [address] [address]  [phone]  Dept: [phone]       Ronen Cruz MD  11/04/23 0348

## 2023-11-07 NOTE — TUMOR CONFERENCE
"Tumor Conference Information  Tumor Conference: Thoracic  Specialties Present: Medical oncology, Radiation oncology, Pathology, Radiology, Surgery  Patient Status: Retrospective  Stage: pTmiN0  Treatment to Date: Biopsy, Surgery  Clinical Trials: Discussed (see comment)  Genetic Testing Discussed/Recommended?: Already Completed  Supportive Care Services Discussed/Recommended?: No  Recommended Plan: Follows evidence-based guidelines  Did the review exceed 30 minutes?: did not       Thoracic Tumor Conference      Patient Name: Linda J Mrozinski    Reason for conference discussion (brief overview): 74 yo female s/p RLL lobectomy for a lung cancer. She had a needle biopsy that was positive for adenocarcinoma. EBUS was negative. Final path from her lobectomy reads as \"residual\" however, she did not have neoadjuvant treatment. Also, there is no synoptic for TNM staging on the path report.     Specific Question:  Can path report be amended to include a synoptic? Why is the term residual used in the pathology report as she has not received neoadjuvant therapy?    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  -No evidence of residual invasive malignancy identified.    Referring Physician: Dr. Broderick    The patient's case was presented at the multidisciplinary conference for the above noted reason.  There was a consensus recommendation for the following actions:     Dr. Costello will review the pathology with Dr. Moctezuma and will amend the pathology report to include a synoptic. She will also discuss the use of the term residual.      Case Lead:  Soni Abad    Interventional Radiology Staff Present: N/A        Documentation / Disclaimer Cancer Tumor Board Note  Cancer tumor board recommendations do not override what is determined to be reasonable care and treatment, which is dependent on the circumstances of a " patient's case; the patient's medical, social, and personal concerns; and the clinical judgment of the oncologist [physician].

## 2023-11-07 NOTE — TUMOR CONFERENCE
Tumor Conference Information  Tumor Conference: Thoracic  Specialties Present: Medical oncology, Radiation oncology, Pathology, Radiology, Surgery  Patient Status: Retrospective  Stage: pTmiN0  Treatment to Date: Biopsy, Surgery  Did the review exceed 30 minutes?: did not           Documentation / Disclaimer Cancer Tumor Board Note  Cancer tumor board recommendations do not override what is determined to be reasonable care and treatment, which is dependent on the circumstances of a patient's case; the patient's medical, social, and personal concerns; and the clinical judgment of the oncologist [physician].

## 2023-11-08 NOTE — TELEPHONE ENCOUNTER
----- Message from Vonnie Goldman RN sent at 11/8/2023  3:26 PM CST -----    ----- Message -----  From: Mita Hopkins RN  Sent: 11/8/2023   3:00 PM CST  To: Vonnie Goldman RN; #    Dr Broderick just saw this patient for a 2 week post op follow-up.     She needs to be scheduled for the following at [place]--    1-Appointment with ZURI Abad in 1 month with Chest Xray prior    2-Appointment with Dr Broderick in 10 weeks with a Chest CT prior.    Orders are in for both. Please call patient to schedule.    Thanks  Mita Hopkins RN, BSN  Thoracic Surgery RN Care Coordinator

## 2023-11-08 NOTE — NURSING NOTE
"Oncology Rooming Note    November 8, 2023 2:13 PM   Linda J Mrozinski is a 73 year old female who presents for:    Chief Complaint   Patient presents with    Oncology Clinic Visit     [facility] RETURN - LUNG CANCER     Initial Vitals: BP (!) 183/85 (BP Location: Right arm, Patient Position: Chair, Cuff Size: Adult Regular)   Pulse 61   Temp 97.8  F (36.6  C)   Resp 16   Ht 1.575 m (5' 2.01\")   Wt 100.2 kg (221 lb)   SpO2 96%   BMI 40.41 kg/m   Estimated body mass index is 40.41 kg/m  as calculated from the following:    Height as of this encounter: 1.575 m (5' 2.01\").    Weight as of this encounter: 100.2 kg (221 lb). Body surface area is 2.09 meters squared.  Moderate Pain (4) Comment: Data Unavailable   No LMP recorded. Patient is postmenopausal.  Allergies reviewed: Yes  Medications reviewed: Yes    Medications: Medication refills not needed today.  Pharmacy name entered into [facility]: [id number] - [place], [place] - [address]              "

## 2023-11-08 NOTE — LETTER
"    11/8/2023         RE: Linda J Mrozinski  [address]  [place] [facility] [place] [facility] [address]        Dear Colleague,    Thank you for referring your patient, Linda J Mrozinski, to the [facility]. Please see a copy of my visit note below.    THORACIC SURGERY FOLLOW UP VISIT    Dear Dr. Brian,  I saw Ms. Mrozinski in follow-up today. The clinical summary follows:     PREOP DIAGNOSIS   RLL adenocarcinoma  PROCEDURE   RIGHT uniportal VATS RLL lobectomy    DATE OF PROCEDURE  10/24/2023    HISTOPATHOLOGY   Adenocarcinoma in situ, 5 mm, within a 3.2 cm scar. LN negative.    COMPLICATIONS  None, but her chest tube fell out on POD#1 and we did not place a new one.     INTERVAL STUDIES  CXR 11/8/2023: Expected postop changes, small hydropneumothorax and small effusion      ETOH neg  TOB neg    SUBJECTIVE   Ms. Mrozinski is gradually improving, she has the expected cough, fatigue, chest discomfort, and she is not sleeping well. She is not taking any opioid medications.  Recently, she went to the ER for a sharp right lower les k pain, which is the same pain she had a few months ago, when her lung nodule was incidentally found. It's intermittent.      EXAM  BP (!) 183/85 (BP Location: Right arm, Patient Position: Chair, Cuff Size: Adult Regular)   Pulse 61   Temp 97.8  F (36.6  C)   Resp 16   Ht 1.575 m (5' 2.01\")   Wt 100.2 kg (221 lb)   SpO2 96%   BMI 40.41 kg/m    Incision healing well.    From a personal perspective, she is here with her niece.    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 I reviewed the plan as follows:  1) I reassured her that she is " going through the expected postop recovery  2) RIGHT back pain: This pain does not appear to be related to her nodule nor her recent surgery. She will ask Dr. Brian to help with a work-up once she has recovered more.  3) Follow-up in 4 weeks with LYNN Perales, with a CXR and in 10 weeks with me with a new baseline chest CT.    All questions were answered and the patient and present family were in agreement with the plan.  I appreciate the opportunity to participate in the care of your patient and will keep you updated.  Sincerely,       Milan Broderick MD

## 2023-11-08 NOTE — PROGRESS NOTES
"THORACIC SURGERY FOLLOW UP VISIT    Dear Dr. Brian,  I saw Ms. Mrozinski in follow-up today. The clinical summary follows:     PREOP DIAGNOSIS   RLL adenocarcinoma  PROCEDURE   RIGHT uniportal VATS RLL lobectomy    DATE OF PROCEDURE  10/24/2023    HISTOPATHOLOGY   Adenocarcinoma in situ, 5 mm, within a 3.2 cm scar. LN negative.    COMPLICATIONS  None, but her chest tube fell out on POD#1 and we did not place a new one.     INTERVAL STUDIES  CXR 11/8/2023: Expected postop changes, small hydropneumothorax and small effusion      ETOH neg  TOB neg    SUBJECTIVE   Ms. Mrozinski is gradually improving, she has the expected cough, fatigue, chest discomfort, and she is not sleeping well. She is not taking any opioid medications.  Recently, she went to the ER for a sharp right lower les k pain, which is the same pain she had a few months ago, when her lung nodule was incidentally found. It's intermittent.      EXAM  BP (!) 183/85 (BP Location: Right arm, Patient Position: Chair, Cuff Size: Adult Regular)   Pulse 61   Temp 97.8  F (36.6  C)   Resp 16   Ht 1.575 m (5' 2.01\")   Wt 100.2 kg (221 lb)   SpO2 96%   BMI 40.41 kg/m    Incision healing well.    From a personal perspective, she is here with her niece.    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 I reviewed the plan as follows:  1) I reassured her that she is going through the expected postop recovery  2) RIGHT back pain: This pain does not appear to be related to her nodule nor her recent surgery. She will ask Dr. Brian to help with a work-up once she has recovered more.  3) Follow-up in 4 weeks with Soni Abad, " CNS, with a CXR and in 10 weeks with me with a new baseline chest CT.    All questions were answered and the patient and present family were in agreement with the plan.  I appreciate the opportunity to participate in the care of your patient and will keep you updated.  Sincerely,

## 2023-11-09 NOTE — TELEPHONE ENCOUNTER
[facility]: Cancer Care                                                                                          Received call from patient. Patient verbalized that she been on the phone for the past 40 minutes trying to reach someone at [place] to schedule the follow-up appointments that she needs to have with Soni and Dr Broderick, and no one has been able to help her.  States she called the number that was left in the message and asked for the Pulmonary Clinic. Explained that writer works with Dr Broderick at the [facility] but will reach out to his staff at [place] and ask if they reach out to her directly.  Patient verbalized her understanding and appreciated writer's assistance.     Secure Chat sent to Vonnie Goldman and Jocy Chin. Jocy stated had left patient 2 messages but will call her again.     Signature:  Mita Hopkins RN, BSN  Thoracic Surgery RN Care Coordinator

## 2023-11-18 PROBLEM — J94.8 HYDROPNEUMOTHORAX: Status: ACTIVE | Noted: 2023-01-01

## 2023-11-18 NOTE — PROGRESS NOTES
"Bronchoscopy Risk Assessment Guidelines      A. Patient symptoms to consider when assessing pulmonary TB risk are:    I. Cough greater than 3 weeks; and fever, hemoptysis, pleuritic chest    pain, weight loss greater than 10 lbs, night sweats, fatigue, infiltrates on    upper lobes or superior segments of lower lobes, cavitation on chest    x-ray.   B. Patient risk factors to consider when assessing pulmonary TB risk are:    I. Exposure to known TB case, foreign-born persons (within 5 years of    arrival to [place]), residence in a crowded setting (correctional facility,     long-term care center, etc.), persons with HIV or immunosuppression.    Patients with symptoms and risk factors should generally be considered \"suspect risk\" and bronchoscopies should be performed in airborne precautions.    This patient has NO KNOWN RISK of Tuberculosis (proceed with bronchoscopy)    Specimens sent: yes  Complications: None  Scope used: #[id number]  Slim  Attending Physician: Dr. Davie Talbot, RT on 11/18/2023 at 11:50 AM    "

## 2023-11-18 NOTE — PROGRESS NOTES
Major Shift Events:  Pt arrived from [facility] around 0000. Pt arrived intubated, R CT to suction, no drips.   Neuro: Unresponsive, does not respond to pain stimuli in uppers, slight toe movements. Both eyes deviated upwards, pupils equal and brisk response. Slight cough reflex. On 30 of prop for seizure prophylaxis.   CV: NSR 60-70. No ectopy noted. SBP <170. Tmax 100.8.   Resp: CMV settings 40%/420/12/5. Sats high 90s. R chest tube with significant air leak.  Minimal serosang output. Moderate thick creamy secretions inline and oral.   GI/: Og clamped for NPO status. Manning with good UOP. 1 BM this shift.   GTTS: Propofol at 30.   Plan: Monitor neuro status.   For vital signs and complete assessments, please see documentation flowsheets.

## 2023-11-18 NOTE — PLAN OF CARE
Goal Outcome Evaluation:      Plan of Care Reviewed With: other (see comments)    Overall Patient Progress: no changeOverall Patient Progress: no change    Outcome Evaluation: See RD note

## 2023-11-18 NOTE — PROGRESS NOTES
[facility]  _________________________________________________________________    [facility] 24[facility]7 Contact Number: [phone]    - Providers: Please contact [facility] with changes in orders or clinical plan of care   - Nursing: Please contact [facility] with significant changes in patient condition    Hospice will notify the care team (including the hospitalist) to confirm date of inpatient hospice (GIP) admission.    New Epic encounter will not be created until hospice completes admission.   _____________________________________________________________________    AC CALLED SPOUSE TIMOTHY TO SET UP IH PHONE VIST, TIMOTHY DECLINED SAID THERE NOT MUCH TO BE DUNE RIGHT NOW AS THEY DON'T THINK PT WILL MAKE IT LONG. TIMOTHY REQUESTED A F/U PHONE VISIT TOMORROW 11/19 AC TO FOLLOW

## 2023-11-18 NOTE — PHARMACY-CONSULT NOTE
Pharmacy Tube Feeding Consult    Medication reviewed for administration by feeding tube and for potential food/drug interactions.    Recommendation: No changes are needed at this time.     Pharmacy will continue to follow as new medications are ordered.    Samantha Mcbride, AndersD, BCCCP

## 2023-11-18 NOTE — PROGRESS NOTES
[facility]  _________________________________________________________________    [facility] 24/7 Contact Number: [phone]    - Providers: Please contact [facility] with changes in orders or clinical plan of care   - Nursing: Please contact [facility] with significant changes in patient condition    Hospice will notify the care team (including the hospitalist) to confirm date of inpatient hospice (GIP) admission.    New Epic encounter will not be created until hospice completes admission.   _____________________________________________________________________    AC RECEIVED REFERRAL VIA EPIC FROM [facility], NO HCD ON FILE, NO DX, ALL OTHER FORMS HAVE BEEN UPLOADED TO CHART, AC CALLED REFERRAL SOURCE, AC WAS TRANSFERED TO THE NURSE WHO WAS UNABLE TO ANSWER THE PHONE, AC TO FOLLOW UP WITH PT/FAMILY TO SCHEDULE

## 2023-11-18 NOTE — PROGRESS NOTES
Critical Care Services Attending Note:     I saw and examined the patient.  I agree with the resident (Lia Sprinegr MD ) note.    Linda J Mrozinski remains critically 2/2 cardiac arrest.    This is a 73-year-old female patient, she has a history of non-small cell lung carcinoma SP right lower lobe lobectomy by Dr. Olsen from thoracic surgery on 10/24/2023, her postoperative course was complicated with persistent hydropneumothorax.  She was admitted to [facility] on 11/15/2023 due to out of the hospital cardiac arrest, this was witnessed by her spouse who called EMS, and she had cardiopulmonary resuscitation for 15 minutes with 3 rounds of epinephrine and 1 round of sodium bicarb.  She achieved ROSC.  In the emergency department there she had chest imaging studies showing right hydropneumothorax which was complicated and loculated.  She did have chest tube placed by IR there, and the fluid analysis showed exudative fluid with more than 20,000 WBCs, and more than 90% neutrophilic, cultures has been negative.  Her chest imaging studies showed dense consolidation bilaterally.  Patient was treated with antibiotics.  She was admitted to the intensive care unit under MICU service there, and she was noted to have myoclonic movements, neuro ICU was consulted, there was a concern for subcortical myoclonic movements, she was started on Keppra.  Reportedly since her admission to [facility] she has not been able to wake up.  Neuro prognostication process was ongoing by neuro ICU and [facility].  The patient was accepted to the [facility] ICU by thoracic surgery.  Here in the ICU she was not following commands, she was responsive only to painful stimulation, her pupils were equal and reactive, she did not have gag reflex, no cough reflex, and she was breathing over the vent.  She did have coarse crackles and rhonchi bilaterally.  Chest tube was noted to have significant air leak in the atrium along with  serosanguineous fluid.     I personally examined and evaluated the patient today.   The patient s prognosis today is critical  I have evaluated all laboratory values and imaging studies from the past 24 hours.  Key findings and decisions made today included  Postcardiac arrest  Hydropneumothorax on the right with loculated complex pleural effusion SP chest tube.  Encephalopathy, and decreased level of consciousness, concern for anoxic brain injury.    My interventions included  Vent management, patient has no evidence for air trapping, plateau less than 30, tidal volume 420, and PEEP of 5.  Keep the chest tube to suction  Order chest CT scan, there is a concern for right lower lobe stump dehiscence or fistula, reportedly she did have a bronchoscopy which showed no evidence of this, we will start with a CT scan, after reviewing that, might consider direct visualization with bronchoscopy.  We will consult thoracic surgery.  Concerning neurologic function postcardiac arrest,?  Nonconvulsive status epilepticus, we consulted neuro ICU, she will be started on propofol and Keppra, in addition to that she will get EEG monitoring per neurology.  We ordered head CT scan.  Antibiotics with IV Zosyn, infectious work-up blood cultures, and sputum culture    I personally managed the ventilator, sedation, and antibiotic therapy.   Consults ongoing and ordered are Neurology and Surgery  Procedures that will happen today are: none  All treatments were placed at my direction.  I formulated today s plan with the house staff team or resident(s) and agree with the findings and plan in the associated note.      I spent a total of 60 minutes (excluding procedure time) personally providing and directing critical care services at the bedside and on the critical care unit for Linda J Mrozinski.        Cristian Zelaya MD

## 2023-11-18 NOTE — CONSULTS
SURGICAL ICU ADMISSION NOTE  11/18/2023    PRIMARY TEAM: Thoracic Surgery   PRIMARY PHYSICIAN: Dr. Broderick     REASON FOR CRITICAL CARE ADMISSION: Hemodynamic monitoring and ventilatory support    ADMITTING PHYSICIAN: Dr. Zelaya     ASSESSMENT: Ms. Mrozinski is a 72 y/o female w/ PMHx significant for HTN, hypothyroidism, ascending cholangitis, and NSCLC s/p uniportal thoracoscopic right lower lobectomy and intercostal muscle flap reinforcement of bronchus stump on 10/24/2023 who experienced recent cardiac arrest at home s/p CPR x 15 minutes w/ administration of epi x3 rounds w/ ROSC. ED course at [facility] was notable for myoclonus and respiratory and metabolic acidosis, was found to have right sided hydropneumothorax now s/p right sided chest tube placement and initiation of broad spectrum antibiotics. Neurology was consulted with EEG monitoring reading subcortical myoclonus without seizures. Keppra was started on 11/17. Versed and dilaudid gtt were used prn to suppress myoclonus. She was transferred to [facility] for further management on 11/18/2023.       PLAN:   Neuro/ pain/ sedation:  # Concern for ischemic anoxic brain injury   # Hypoxic encephalopathy   - Monitor neurological status. Notify the MD for any acute changes in exam.  - Neuro critical care consulted, appreciate recs    - Continue Keppra at 1 g twice daily  - Start propofol at 30 mcg/kg/min and titrate up to 40 mcg/kg/min, as tolerated, for sedation until EEG leads can be applied   - Plan to start EEG in the morning  - Avoid hypotonic solutions as they may worsen cerebral edema  - Avoid nitroprusside/nitroglycerin as able - can increase ICP's   - Advise slow correction of any high sodiums if needed  - When safe to do so, limitation of CNS acting medications will permit a more accurate neurological assessment  - We will continue to follow and monitor EEG and neurologic exam  - CT Head 11/18 notable for blurring of gray white differentiation with  increased sulcal effacement concerning for diffuse hypoxic ischemic injury   - Per discussion with [facility], they would like to obtain MRI brain, ordered for this morning      Pulmonary care:   # Acute hypoxic and hypercarbic respiratory failure on mechanical ventilation   # Concern for aspiration pneumonia   - Vent Mode: CMV/AC  (Continuous Mandatory Ventilation/ Assist Control)  FiO2 (%): 40 %  Resp Rate (Set): 12 breaths/min  Tidal Volume (Set, mL): 420 mL  PEEP (cm H2O): 5 cmH2O  Resp: 25    - Supplemental oxygen to keep saturation above 92 %.  - Maintain normal pCO2 levels; may need to hyperventilate if concerned for increased ICP   - Right sided pigtail chest tube in place, keep to -20 cm H2O suction   - CT Chest 11/18 w/ right sided chest tube outside chest wall, moderate right hydropneumothorax, no definite bronchopleural fistula visualized, large ectopic air collection at chest tube insertion site tracking posteriorly to paraspinal muscle, patchy consolidative opacities throughout lungs likely infectious vs inflammatory   - Will consider replacing right sided chest tube this morning pending thoracic surgery eval        Cardiovascular:    # Cardiac arrest s/p CPR and ROSC   - Monitor hemodynamic status, continue telemetry   - Currently hypertensive w/ normal heart rates   - nicardipine gtt for SBP goal <150 and DBP goal<100.        GI care:   - NPO, OGT to LIS. Ok for meds through OGT.   - Hold tube feeds in setting of concern for aspiration pneumonia en route from OSH  - AXR with OGT at level of GE junction. Will advance ~ 5 cm this morning.   - 1x BM on 11/18       Renal/ Fluid Balance/Electrolytes:  # Hypernatremia   # Hypophosphatemia   - Urine output is 325 (~46 ml/hr) adequate so far.  - Will continue to monitor intake and output.  - Hold IVF hydration, if fluids needed, limit free water and hypotonic solution in setting of  cerebral edema   - ICU electrolyte replacement protocol  - Nutrition consulted.  Appreciate recs     Endocrine:    # Hypothyroidism  - Restart PTA levothyroxine IV    # Stress hyperglycemia  - Glucoses 120s since admission  - Hypoglycemia protocol, hold off on sliding scale insulin at this time      ID/ Antibiotics:  # Concern for aspiration pneumonia   # Possible empyema   - Lactate 1.7, WBC  13.2, Procal 1.16 this AM, UA without UTI   - Continue IV Zosyn      Heme:     # Anemia of critical illness  - Hgb 11.6 this morning, continue to monitor      Prophylaxis:    - Mechanical prophylaxis for DVT. Will begin subcutaneous heparin.   - PPI      MSK:  - Frequent turns to prevent skin breakdown      Lines/ tubes/ drains:  - ETT  - PIV x 3   - Manning   - OGT  - R Chest tube      Disposition:  - Surgical ICU.     Patient seen, findings and plan discussed with surgical ICU staff.    Lia Springer MD PGY2  General Surgery Resident     - - - - - - - - - - - - - - - - - - - - - - - - - - - - - - - - - - - - - - - - - - - - - - - - - - - - - - - - - - - - - - - - - - - - - - - -     HISTORY PRESENTING ILLNESS:     Ms. Mrozinski is a 74 y/o female w/ PMHx significant for HTN, hypothyroidism, ascending cholangitis, and NSCLC s/p uniportal thoracoscopic right lower lobectomy and intercostal muscle flap reinforcement of bronchus stump on 10/24/2023 who experienced recent cardiac arrest at home s/p CPR x 15 minutes w/ administration of epi x3 rounds w/ ROSC. ED course at [facility] hospital was notable for myoclonus and respiratory and metabolic acidosis, was found to have right sided hydropneumothorax now s/p right sided chest tube placement and initiation of broad spectrum antibiotics. Neurology was consulted with EEG monitoring reading subcortical myoclonus without seizures. Keppra was started on 11/17. Versed and dilaudid gtt were used prn to suppress myoclonus. She was transferred to [facility] for further management on 11/18/2023.     REVIEW OF SYSTEMS: 10 point ROS neg other than the symptoms noted above in  "the HPI.    PAST MEDICAL HISTORY:    has a past medical history of Asthma, Depression, Gallstone pancreatitis, Gastroesophageal reflux disease, Gout, HTN (hypertension), Hypothyroidism, Malignant neoplasm of lower lobe of right lung (H), and Osteoarthritis.    She has no past medical history of Complication of anesthesia, Diabetes (H), or Sleep apnea.    SURGICAL HISTORY:    has a past surgical history that includes joint replacement (Right, 01/01/2011); tubal ligation; Laparoscopic cholecystectomy (N/A, 03/18/2015); Eye surgery; Bronchoscopy Rigid Or Flexible W/Transendoscopic Endobronchial Ultrasound Guided (N/A, 9/28/2023); and Thoracoscopic Lobectomy Lung (Right, 10/24/2023).    SOCIAL HISTORY:    reports that she has never smoked. She has never used smokeless tobacco. She reports current alcohol use. She reports current drug use. Drug: Marijuana.    FAMILY HISTORY: No bleeding/clotting disorders nor problems with anesthesia.     ALLERGIES:      Allergies   Allergen Reactions    Indomethacin Headache    Penicillins Hives     Pt reports she has tolerated amoxicillin in the past (e.g. prior to a dental procedure in April 2015)    Amoxicillin \"ok\"    Piroxicam Nausea and Headache       MEDICATIONS:  No current facility-administered medications on file prior to encounter.  albuterol (PROVENTIL HFA;VENTOLIN HFA) 90 mcg/actuation inhaler, Inhale 1-2 puffs into the lungs every 6 hours as needed for shortness of breath or wheezing  budesonide-formoterol (SYMBICORT) 160-4.5 MCG/ACT Inhaler, Inhale 2 puffs into the lungs 2 times daily  cholecalciferol, vitamin D3, 1,000 unit tablet, Take 2,000 Units by mouth every morning  cyclobenzaprine (FLEXERIL) 5 MG tablet, Take 1 tablet (5 mg) by mouth nightly as needed for muscle spasms  HYDROcodone-acetaminophen (NORCO) 5-325 MG tablet, Take 2 tablets by mouth every 4 hours as needed for severe pain (Patient not taking: Reported on 11/8/2023)  ibuprofen (ADVIL/MOTRIN) 200 MG " tablet, Take 400 mg by mouth every 4 hours as needed for pain  latanoprost (XALATAN) 0.005 % ophthalmic solution, [LATANOPROST (XALATAN) 0.005 % OPHTHALMIC SOLUTION] Administer 1 drop to both eyes bedtime.  levothyroxine (SYNTHROID, LEVOTHROID) 112 MCG tablet, Take 112 mcg by mouth every morning  melatonin 5 MG tablet, Take 5 mg by mouth at bedtime  methocarbamol (ROBAXIN) 500 MG tablet, Take 1 tablet (500 mg) by mouth every 6 hours as needed for muscle spasms (Patient not taking: Reported on 11/8/2023)  polyethylene glycol (MIRALAX) 17 g packet, Take 17 g by mouth daily  psyllium (METAMUCIL/KONSYL) 58.6 % powder, Take 1 teaspoonful by mouth daily (Patient not taking: Reported on 11/8/2023)  senna-docusate (SENOKOT-S/PERICOLACE) 8.6-50 MG tablet, Take 1 tablet by mouth 2 times daily (Patient not taking: Reported on 11/8/2023)  timolol maleate (TIMOPTIC) 0.5 % ophthalmic solution, Place 1 drop into both eyes 2 times daily        PHYSICAL EXAMINATION:  Temp:  [99.7  F (37.6  C)] 99.7  F (37.6  C)  Pulse:  [62-69] 64  Resp:  [17-23] 23  BP: (143-174)/(70-79) 174/77  FiO2 (%):  [40 %] 40 %  SpO2:  [100 %] 100 %    General: intubated and sedated   Resp: nonlabored breathing on vent  CV: RRR, normotensive   Abd: Soft, NT, ND, no masses, no scars. Scattered hemangiomas  Extremities: WWP, peripheral pulses present, trace pitting edema   Neuro: obtunded and unresponsive. Does not wake to voice or noxious stimuli. Slight movement in BLE lower extremities to pain, BLE extensor posturing. Pupils roughly equal and reactive.     LABS: Reviewed.   Arterial Blood Gases   No lab results found in last 7 days.  Complete Blood Count   Recent Labs   Lab 11/18/23 0148   WBC 13.2*   HGB 11.6*        Basic Metabolic Panel  Recent Labs   Lab 11/18/23 0148 11/18/23  0034   *  --    POTASSIUM 3.4  --    CHLORIDE 109*  --    CO2 27  --    BUN 27.5*  --    CR 0.66  --    * 125*     Liver Function Tests  Recent Labs   Lab  "11/18/23 0148   AST 71*   ALT 24   ALKPHOS 99   BILITOTAL 0.7   ALBUMIN 2.6*   INR 1.18*     Pancreatic Enzymes  No lab results found in last 7 days.  Coagulation Profile  Recent Labs   Lab 11/18/23 0148   INR 1.18*     Lactate  Invalid input(s): \"LACTATE\"    IMAGING:  Recent Results (from the past 24 hour(s))   XR Abdomen Port 1 View    Impression    RESIDENT PRELIMINARY INTERPRETATION  IMPRESSION: Enteric tube sidehole projects over the gastroesophageal  junction. Recommend advancement.   XR Chest Port 1 View    Impression    RESIDENT PRELIMINARY INTERPRETATION  Impression:   1. There is a collection of gas in the right lateral chest adjacent to  the rib cage which appears to communicate with the lung parenchyma and  may represent herniated lung.   2. Endotracheal tube tip projects over the midthoracic trachea.  3. Probable small right basilar hydropneumothorax.  4. Irregular streaky perihilar and bibasilar opacities, may represent  atelectasis/edema.       "

## 2023-11-18 NOTE — PROGRESS NOTES
[facility]  _________________________________________________________________    [facility] 24/7 Contact Number: [phone]    - Providers: Please contact [facility] with changes in orders or clinical plan of care   - Nursing: Please contact [facility] with significant changes in patient condition    Hospice will notify the care team (including the hospitalist) to confirm date of inpatient hospice (GIP) admission.    New Epic encounter will not be created until hospice completes admission.   _____________________________________________________________________    THANK YOU FOR THE REFERRAL     [facility] HAS RECEIVED REFERRAL VIA JAIME,  TO FOLLOW AND SCHEDULE     Flaquita CARR [facility] Admissions Coordinator  Office: [phone] ext. [phone]  Fax: [phone]

## 2023-11-18 NOTE — PROGRESS NOTES
CLINICAL NUTRITION SERVICES - ASSESSMENT NOTE     Nutrition Prescription    RECOMMENDATIONS FOR MDs/PROVIDERS TO ORDER:  --Additional water flushes as per primary team.     Malnutrition Status:    Patient does not meet two of the established criteria necessary for diagnosing malnutrition but is at risk for malnutrition    Recommendations already ordered by Registered Dietitian (RD):  --Enteral access: OGT  --Pending AXR confirmation of feeding tube position  --GOAL: Vital High Protein @ goal of 60ml/hr (1440ml/day) will provide: 1440 kcals (14 kcal/kg actual wt), 125 g PRO (2.5 g/kg IBW), 1203 ml free H20, 159 g CHO, and 0 g fiber daily.   --Start TF @ 15 ml/hr and advance by 15 ml q 8 hrs until goal rate.  --Do not start or advance TF rate unless K+ >3.0, Mg++ > 1.5,  and Phos > 1.9.  --Minimum 30 ml q 4 hrs water flushes for tube patency.  --If gastric enteral access: HOB > 30 degrees or Reverse Trendelenburg >10-25 degrees.  --MVI/minerals supplement if not already ordered (Certavite).    Future/Additional Recommendations:  --Tolerance via gastric access.   --Weight trends, stool trends.  --Metabolic cart study as able once TF at goal rate.      REASON FOR ASSESSMENT  Linda J Mrozinski is a/an 73 year old female assessed by the dietitian for Provider Order - Registered Dietitian to Assess and Order TF per Medical Nutrition Therapy Protocol    NUTRITION/MEDICAL HISTORY  PMH HTN, hypothyroidism, ascending cholangitis, and NSCLC s/p uniportal thoracoscopic right lower lobectomy and intercostal muscle flap reinforcement of bronchus stump on 10/24/2023 who experienced recent cardiac arrest at home s/p CPR x 15 minutes w/ administration of epi x3 rounds w/ ROSC. ED course at [facility] hospital was notable for myoclonus and respiratory and metabolic acidosis, was found to have right sided hydropneumothorax now s/p right sided chest tube placement and initiation of broad spectrum antibiotics. Neurology was consulted with  "EEG monitoring reading subcortical myoclonus without seizures. Keppra was started on 11/17. She was transferred to [facility] for further management on 11/18/2023.      Per provider note, \"AXR with OGT at level of GE junction. Will advance ~ 5 cm this morning.\" Discussed with RN, OGT advanced and awaiting AXR confirmation.     CURRENT NUTRITION ORDERS  Diet: NPO    LABS  Na 146 (H)  K+ 3.4 (low normal)  BUN 27.5 (H)  Phos 2.4 (L)    MEDICATIONS  Synthroid    ANTHROPOMETRICS  Height: 0 cm (Data Unavailable)   Ht Readings from Last 2 Encounters:   11/08/23 1.575 m (5' 2.01\")   10/24/23 1.575 m (5' 2\")     Most Recent Weight: 103.8 kg (228 lb 13.4 oz)    IBW: 50 kg  BMI: Obesity Grade III BMI >40  Weight History:   Wt Readings from Last 30 Encounters:   11/18/23 103.8 kg (228 lb 13.4 oz)   11/08/23 100.2 kg (221 lb)   11/03/23 98 kg (216 lb)   10/26/23 101 kg (222 lb 11.2 oz)   10/11/23 103 kg (227 lb)   10/05/23 101 kg (222 lb 9.6 oz)   10/01/23 102.5 kg (226 lb)   09/28/23 102.5 kg (226 lb)   09/27/23 102.5 kg (226 lb)   09/07/23 103.5 kg (228 lb 3.2 oz)   03/19/15 124.6 kg (274 lb 11.2 oz)   03/19/15 124.6 kg (274 lb 11.2 oz)   03/14/15 117.5 kg (259 lb)   03/14/15 115.7 kg (255 lb)     Dosing Weight: 103.8 kg actual weight for energy needs and 50 kg IBW for protein needs    ASSESSED NUTRITION NEEDS  Estimated Energy Needs: 1958-7974+ kcals/day (11 - 14+ kcals/kg actual wt)  Justification: Increased needs, Obese, and Vented  Estimated Protein Needs: 100-125 grams protein/day (2 - 2.5 grams of pro/kg IBW)  Justification: Hypercatabolism with critical illness and Obesity guidelines  Estimated Fluid Needs: (1 mL/kcal)   Justification: Maintenance and On a fluid restriction    PHYSICAL FINDINGS  See malnutrition section below.    MALNUTRITION  % Intake: Unable to assess  % Weight Loss: None noted  Subcutaneous Fat Loss: None observed  Muscle Loss: Temporal:  mild, Thoracic region (clavicle, acromium bone, deltoid, trapezius, " pectoral):  mild, and Upper arm (bicep, tricep):  mild  Fluid Accumulation/Edema: Mild  Malnutrition Diagnosis: Patient does not meet two of the established criteria necessary for diagnosing malnutrition but is at risk for malnutrition    NUTRITION DIAGNOSIS  Inadequate oral intake related to mechanical ventilation as evidenced by NPO status.       INTERVENTIONS  Implementation  Collaboration with other providers - RN  Enteral Nutrition - Initiate     Goals  Total avg nutritional intake to meet a minimum of 11+ kcal/kg (actual wt 103.8 kg) and 2 g PRO/kg (IBW 50 kg) daily.     Monitoring/Evaluation  Progress toward goals will be monitored and evaluated per protocol.  Samantha Tony, MS, RD, LD, [facility]  SICU: [phone] *[phone]

## 2023-11-18 NOTE — PROGRESS NOTES
STAFF NOTE:  No acute issues since arrival here  Has been off sedatives other than propofol for a while now    Exam >4h off propofol, and over a day off other sedatives:  demonstrates flexor postoring in 3 extremiteis  Pupils reactive and equal  Weak cough  Overbreathing ventilator  Chest tube has an airleak  Bronchoscopy by the thoracic service demonstrates a bronchopleural fistual     Labs unrevealing, but crucially, there is nothing that would contribute to coma (sodium is fine, BUN/renal function are fine, liver function is fine  Urinalysis slightly bloody but otherwise clean    Imaging CT scan shows the tip of the chest tube out of the chest; possible new pneumonia  CT brain shows diffuse loss of gray-white differentiation and edema without signs of impending herniation    This is a 73F NSCCA s/p RLL lobectomy complicated by persistent hydropneumothorax who was discharged but had out of hospital cardiac arrest with 15min CPR on 11/15.  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      Based on the CT brain and her current exam >3d after arrest, I am quite confident that she has suffered a severe, diffuse anoxic injury.  Even if her current exam is due to unrecognized status epilepticus, given her age and comorbidities, her best-case scenario in terms of cognitive outcome is certainly institutionalization with trach/PEG and full reliance on others for cares.  She appears to have enough encephalomalacia and space that she doesn't look to be at particular risk of imminent herniation and brain death, but I suspect minimal arousability is a likely long-term outcome for her.    With the permission and agreement of the Neurocritical Care and Thoracic services, I articulated this prognostication to the .  He reports that [place] would never want prolonged hospitalization / institutionalization, EVEN IF  her cognitive outcome was far, far better than I predict.  She apparently had a mother or sister or something who spent her end-of-life in and out of nursing facilities and hospitals, and that was unacceptable for her.      The  therefore requested that we not engage in further invasive therapies, and felt it appropriate to transition to comfort cares as soon as this evening.    The patient does have a bronchopleural fistula and loculated / contaminated effusion with an inappropriately placed chest tube, but given that she is hemodynamically stable and without hypoxia, we will NOT attempt to reposition or replace the tube at this time since we anticipate transition to comfort cares this evening.      Should the  change his mind about goals of care, then we will ask IR to urgently place a new tube.    METABOLIC ENCEPHALOPATHY / DELIRIUM:  -due to severe hypoxic injury;   -keep off sedation unless she develops myoclonus concerning for seizure    HYPOXIC BRAIN INJURY:  ->15min of CPR, with chest compressions not started until arrival by EMS (ie, minutes after witnessed collapse by the )  -MRI could better delineate the degree of injury, but from my perspective, the CT scan and her current exam >3d from the time of arrest are adequate to be confident in a poor prognosis.  EEG could help ensure that current exam is not due to non-convulsive status epilepticus, but even if she was in status, if anything, that would simply provide more evidence of poor long-term neurologic prognosis. (nonepileptic myoclonic jerks, which she was reported to have at the outside hospital, are generally benign after cardiac arrest and from my perspective do not mean much in terms of prognostication significance)  -keppra 1g bid was started at the outside hospital, [facility] no epileptiform activity was ever reported seen on EEG   -nicardipine to control BP  -no radiographic signs of impending herniation at this time; no  need to drive up sodium, etc    ACUTE HYPOXIC RESPIRATORY FAILURE  -due to hydropneumothorax.  Current chest tube will need to be replaced at some point if we are going to cotinue with aggressive cares.  Will consult IR for urgent replacement if  cchanges his mind about goals of care    -empiric antibiotics with Zosyn given contaminated loculated effusion  -lung protective ventilator settings with Vt <8ml/kg IBW (in this case, at least <500ml)  -no need for diuresis today     S/P CARDIAC ARREST:  -potentially related to the hydropneumothorax / hypoxic arrest.  No need for cardiac cahteterization or formal echo at this time    INADEQUATE ORAL INTAKE:  -if family wants aggressive ongoing cares, will start enteral feeds via her G tube    HYPOTHYROID:  -levothyroxine    CHRONIC ANEMIA:  -no current indication for transfusion    MISC:  -Code status is NO CPR; will transition to comfort cares this evening at the request of the  after he and other family arrive.  -family updated by myself in prolonged conversation  -SQH is fine; PPI for intubation  -lines: peripheral only  -henry while comatose  -if we do not transition to comfort cares, will need trach/PEG and probable discharge to skilled nursing    Billing statement: 82min of critical care time; spent in an initial review of imaging, labs, physical exam, and discussion of the patient with my own team and the extended care team including the primary service; and including family conference (the patient is unable to participate in this discussion because of current neurologic status), where we discussed my recommendations for medical management given my assessment of the patient's prognosis as described above.   Based on this patient's presentation / recent intervention and my bedside assessment, I felt there was or is a reasonably high probability of imminent or life-threatening deterioration today or tonight for neurologic reasons.   My overall critical  care time, as described in detail above, includes such things as coordination of care, arrhythmia and hemodynamics management with infusions of medicines, respiratory management, fluid therapy including fluid boluses, and pain and sedation therapy. This time excludes time I spent personally performing or supervising procedures for this patient.    MANUEL Ambriz MD  Clinical   Anesthesia / Critical Care  *[phone]

## 2023-11-18 NOTE — CONSULTS
Neurocritical Care Consultation    Reason for critical care admission: PEA arrest  Admitting Team: SICU  Date of Service:  11/18/2023  Date of Admission:  11/18/2023  Hospital Day: 1    Assessment/Plan  Linda J Mrozinski is a 73 year old female with a past medical history of NSCLC s/p RLL lobectomy (10/23/2023) c/b hydropneumothorax who was admitted on 11/18/2023 as a transfer from [facility] where she initially presented following an out-of-hospital cardiac arrest.  Unclear total downtime but the arrest was reported to be witnessed and CPR was initiated upon EMS arrival and continued for 15 minutes prior to achieving ROSC. Per outside records, patient was noted to develop subcortical myoclonus which was being treated with Keppra prior to transfer.      Neuro:  #PEA arrest  #Hypoxic encephalopathy  #Concern for subcortical myoclonus  - Continue Keppra at 1 g twice daily  - Start propofol at 30 mcg/kg/min and titrate up to 40 mcg/kg/min, as tolerated, for sedation until EEG leads can be applied   - Plan to start EEG in the morning  - Avoid hypotonic solutions as they may worsen cerebral edema  - Avoid nitroprusside/nitroglycerin as able - can increase ICP's   - Advise slow correction of any high sodiums if needed  - When safe to do so, limitation of CNS acting medications will permit a more accurate neurological assessment  - We will continue to follow and monitor EEG and neurologic exam, please call [phone] with any questions      The patient was discussed with NCC fellow and will be formally staffed with the attending, Dr. Rachel, in the morning.      Yony Hill MD  Neurology Resident, PGY-4      History of Present Illness:  Linda J Mrozinski is a 73 year old female with a past medical history of hypothyroidism and NSCLC s/p RLL lobectomy (10/23/2023) c/b hydropneumothorax who was admitted on 11/18/2023 as a transfer from [facility] where she initially presented on 11/15 following an out-of-hospital  "cardiac arrest.  History is obtained via discussion with the patient's admitting providers and chart review.  Per report, the patient was at home with her  on 11/15 when she voiced that she was experiencing some chest pain and shortness of breath.  Shortly thereafter, she collapsed to the ground, prompting  to call 911.  It is unclear how long it took for EMS to reach her but provider notes from OSH suggest that CPR was not initiated until they arrived.  The patient received CPR for a total of 15 minutes, along with 3 rounds of epinephrine and 1 amp of bicarb before ROSC was achieved.      After she arrived to the emergency department, the patient reportedly developed myoclonus.  After being admitted to the intensive care unit, she was started on continuous video EEG.  Per the documentation we have from the OSH, she was kept on EEG from 11/16 - 11/17.  On the first day of recording, records report, \"EEG consistent with subcortical myoclonus, no seizures.  Some theta activity seen.  Keppra started for myoclonus.\"    Given that the patient's recent lobectomy was completed here at the [facility], the decision was made to have the patient transferred for further management under the SICU service.       Allergies   Allergen Reactions    Indomethacin Headache    Penicillins Hives     Pt reports she has tolerated amoxicillin in the past (e.g. prior to a dental procedure in April 2015)    Amoxicillin \"ok\"    Piroxicam Nausea and Headache       PAST MEDICAL HISTORY:   Past Medical History:   Diagnosis Date    Asthma     Depression     Gallstone pancreatitis     Gastroesophageal reflux disease     Gout     HTN (hypertension)     Hypothyroidism     Malignant neoplasm of lower lobe of right lung (H)     Osteoarthritis        PAST SURGICAL HISTORY:   Past Surgical History:   Procedure Laterality Date    BRONCHOSCOPY RIGID OR FLEXIBLE W/TRANSENDOSCOPIC ENDOBRONCHIAL ULTRASOUND GUIDED N/A 9/28/2023    " "Procedure: BRONCHOSCOPY, WITH ENDOBRONCHIAL ULTRASOUND;  Surgeon: Miguel Mcdonald MD;  [place] [facility] [place]GERY      JOINT REPLACEMENT Right 01/01/2011    right knee    LAPAROSCOPIC CHOLECYSTECTOMY N/A 03/18/2015    Procedure: CHOLECYSTECTOMY LAPAROSCOPIC;  Surgeon: Hussain Langford MD;  Location: [facility] OR;  Service:     THORACOSCOPIC LOBECTOMY LUNG Right 10/24/2023    Procedure: RIGHT video assisted thoracic surgery LOWER LOBECTOMY;  Surgeon: Milan Broderick MD;  Location: [facility] OR    TUBAL LIGATION         FAMILY HISTORY: I have reviewed this patient's family history and updated it with pertinent information if needed.  Family History   Problem Relation Age of Onset    Cancer Mother         breast and lung    Heart Disease Father 52    Anesthesia Reaction No family hx of     Clotting Disorder No family hx of          SOCIAL HISTORY:   Social History     Tobacco Use    Smoking status: Never    Smokeless tobacco: Never    Tobacco comments:     quit in the 1970s   Substance Use Topics    Alcohol use: Yes     Comment: Alcoholic Drinks/day: \"a few/week\" March 2015       ROS: History obtained by chart review. The patient is intubated and unable to provide ROS.     Current Medications:   chlorhexidine  15 mL Mouth/Throat Q12H    levothyroxine  100 mcg Intravenous Daily    pantoprazole  40 mg Intravenous QAM AC       PRN Medications:  glucose **OR** dextrose **OR** glucagon    Infusions:      Physical Examination:  Vitals: BP (!) 154/79   Pulse 69   Temp 99.7  F (37.6  C) (Axillary)   Resp 20   Wt 103.8 kg (228 lb 13.4 oz)   SpO2 100%   BMI 41.84 kg/m    General: Patient is lying in bed unresponsive.  HEENT: NC/AT, ETT in place  CV: Extremities are cool to the touch.  Neuro: Patient is obtunded and unresponsive. Does not alert to voice or noxious stimuli. Pupils are equal, round and reactive to light. Gaze is roughly conjugate. Corneal reflex is absent. Weak cough " "present with deep suctioning. No abnormal or other spontaneous movement appreciated. Flicker of movement in 4/4 extremities with application of proximal noxious stimuli. Plantar responses are extensor bilaterally. Increased tone at the ankles bilaterally with feet in plantarflexion. No clonus elicited with ankle jerk.     Labs:  No results for input(s): \"NA\", \"POTASSIUM\", \"CHLORIDE\", \"CO2\", \"BUN\", \"CR\", \"ANTONY\", \"BILITOTAL\", \"ALKPHOS\", \"ALT\", \"AST\" in the last 168 hours.    Recent Labs   Lab 11/18/23  0148   WBC 13.2*   HGB 11.6*          No results for input(s): \"PH\", \"PCO2\", \"PO2\", \"HCO3\" in the last 168 hours.    All cultures:  No results for input(s): \"CULTURE\" in the last 168 hours.    All relevant imaging and laboratory values personally reviewed.  "

## 2023-11-18 NOTE — PROCEDURES
BEDSIDE PROCEDURE    SURGEON: Milan Broderick     ASSISTANTS: Antonino Roman      SEDATION/PARALYZATION: Patient already intubated, ventilated, unresponsive and not requiring sedation due to severe anoxic brain injury.      ESTIMATED BLOOD LOSS:  Minimal       COMPLICATIONS: None           INDICATIONS FOR PROCEDURE:  The patient is status post right lower lobectomy with concern for a bronchopleural fistula for which bronchoscopy is indicated for both therapeutic and diagnostic purposes. The risks, benefits and alternatives were described to the patient s family, and they were willing to proceed.        DESCRIPTION OF PROCEDURE:  This procedure was performed at the bedside in the surgical intensive care unit. A surgical time-out was undertaken to verify the patient s procedure and site. The patient did not require sedation.      The patient was positioned in a supine position. The flexible bronchoscope was introduced into the trachea via endotracheal tube. Both right and left mainstem bronchi as well as branching bronchioles were visualized. Upon examination of the right bronchial tree, there was evidence of partial dehiscence of the right lower bronchial stump, confirming presence of a bronchopleural fistula. BAL of both the left and right bronchial trees was performed, and the specimen was sent for microbiological studies. The bronchoscope was withdrawn. The patient tolerated the procedure well without complications nor desaturation events.     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  Bilateral airway secretions.     Complications:  None      Dr. Milan Broderick was present and participated in the entire procedure.

## 2023-11-18 NOTE — PROGRESS NOTES
Extubated to comfort with fentanyl gtt for comfort, family at bedside comfort care interventions in place.

## 2023-11-18 NOTE — PROGRESS NOTES
Pt extubated to room air per comfort care order. RN and family present at bedside.    Tobin Henderson, RT

## 2023-11-18 NOTE — PROGRESS NOTES
Admitted/transferred from: [facility]   Reason for admission/transfer: SICU   2 RN skin assessment: completed by Thea LARA And Rani SALVADOR   Result of skin assessment and interventions/actions: Generalized bruising and scabbing, R chest tube to suction, Redness on coccyx.   Height, weight, drug calc weight: Done  Patient belongings (see Flowsheet)  MDRO education added to care planN/A  ?

## 2023-11-18 NOTE — CONSULTS
INTERVENTIONAL RADIOLOGY CONSULT     Ms. Mrozinski is a 74 y/o female w/ PMHx significant for HTN, hypothyroidism, ascending cholangitis, and NSCLC s/p uniportal thoracoscopic right lower lobectomy and intercostal muscle flap reinforcement of bronchus stump on 10/24/2023 who experienced recent cardiac arrest at home s/p CPR x 15 minutes w/ administration of epi x3 rounds w/ ROSC. ED course at [facility] was notable for myoclonus and respiratory and metabolic acidosis, was found to have right sided hydropneumothorax now s/p right sided chest tube placement and initiation of broad spectrum antibiotics. CT scan today demonstrates dislodged chest tube with persistent hydropneumothorax. IR consulted for chest tube placement into fluid.     BP (!) 146/70   Pulse 76   Temp (P) 99.2  F (37.3  C) (Axillary)   Resp 22   Wt 103.8 kg (228 lb 13.4 oz)   SpO2 100%   BMI 41.84 kg/m    Recent Labs   Lab Test 11/18/23  0148   WBC 13.2*   HGB 11.6*        Lab Results   Component Value Date    INR 1.18 11/18/2023    INR 0.98 09/15/2023       Considering patient is stable from respiratory perspective and limited IR staffing resources/time during the weekend will plan for chest tube placement into right fluid collection early next week. Spoke with primary team and they are in agreement with this plan.     Avtar Thomas MD  Interventional Radiology Fellow  IR pass pager: [phone]     -------------------------------------------    Agree with assessment and plan.

## 2023-11-18 NOTE — PROGRESS NOTES
Patient arrived intubated, ETT secured 23 Cm at the lip. Tolerating current ventilator settings, all ventilator values are within normal range. No further respiratory suggestions at this time. Will continue to monitor and assess per [facility] protocol.    CMV 12/420/5+/40%    BP (!) 143/76   Pulse 64   Temp 99.7  F (37.6  C) (Axillary)   Resp 17   Wt 103.8 kg (228 lb 13.4 oz)   SpO2 100%   BMI 41.84 kg/m      Jesus Espinosa RT on 11/18/2023 at 2:02 AM

## 2023-11-19 NOTE — DEATH PRONOUNCEMENT
MD DEATH PRONOUNCEMENT    Called to pronounce Linda J Mrozinski dead.    Physical Exam: Unresponsive to noxious stimuli, Spontaneous respirations absent, Breath sounds absent, Carotid pulse absent, Heart sounds absent, Pupillary light reflex absent, and Corneal blink reflex absent    Patient was pronounced dead at 2:40 PM, 2023.    Preliminary Cause of Death: Cardiac Arrest secondary to Compassionate Extubation following Hypoxic Brain Injury    Principal Problem:    Hydropneumothorax  Active Problems:    Brain death       Infectious disease present?: NO    Communicable disease present? (examples: HIV, chicken pox, TB, Ebola, CJD) :  NO    Multi-drug resistant organism present? (example: MRSA): NO    Please consider an autopsy if any of the following exist:  NO Unexpected or unexplained death during or following any dental, medical, or surgical diagnostic treatment procedures.   NO Death of mother at or up to seven days after delivery.     NO All  and pediatric deaths.     NO Death where the cause is sufficiently obscure to delay completion of the death certificate.   NO Deaths in which autopsy would confirm a suspected illness/condition that would affect surviving family members or recipients of transplanted organs.     The following deaths must be reported to the 's Office:  NO A death that may be due entirely or in part to any factors other than natural disease (recent surgery, recent trauma, suspected abuse/neglect).   NO A death that may be an accident, suicide, or homicide.     NO Any sudden, unexpected death in which there is no prior history of significant heart disease or any other condition associated with sudden death.   NO A death under suspicious, unusual, or unexpected circumstances.    NO Any death which is apparently due to natural causes but in which the  does not have a personal physician familiar with the patient s medical history, social, or environmental  situation or the circumstances of the terminal event.   NO Any death apparently due to Sudden Infant Death Syndrome.     NO Deaths that occur during, in association with, or as consequences of a diagnostic, therapeutic, or anesthetic procedure.   NO Any death in which a fracture of a major bone has occurred within the past (6) six months.   NO A death of persons note seen by their physician within 120 days of demise.     NO Any death in which the  was an inmate of a public institution or was in the custody of Law Enforcement personnel.   NO  All unexpected deaths of children   NO Solid organ donors   NO Unidentified bodies   NO Deaths of persons whose bodies are to be cremated or otherwise disposed of so that the bodies will later be unavailable for examination;   NO Deaths unattended by a physician outside of a licensed healthcare facility or licensed residential hospice program   NO Deaths occurring within 24 hours of arrival to a health care facility if death is unexpected.    NO Deaths associated with the decedent s employment.   NO Deaths attributed to acts of terrorism.   NO Any death in which there is uncertainty as to whether it is a medical examiner s care should be discussed with the medical investigator.        Body disposition: Autopsy was discussed with family member:  Family members by phone.  Permission for autopsy was declined.  Body released to the morgue.

## 2023-11-19 NOTE — PROGRESS NOTES
[facility]  _________________________________________________________________    [facility] 24/7 Contact Number: [phone]    - Providers: Please contact [facility] with changes in orders or clinical plan of care   - Nursing: Please contact [facility] with significant changes in patient condition    Hospice will notify the care team (including the hospitalist) to confirm date of inpatient hospice (GIP) admission.    New Epic encounter will not be created until hospice completes admission.   _____________________________________________________________________    AC CALLED NIECE SAUNDRA, SPOKE WITH HER AND PTS SISTER MINA ON SPEAKER PHONE, FAMILY REQ MORE INFORMATION ABOUT HOSPICE SERVICES, AC SCHEDULED A IH PHONE VISIT WITH CHAPLAIN CYNDEE GARCIA TODAY 11/19 AT 1 PM, FAMILY STATED THAT THEY WOULD LIKE TO 3 WAY PTS  IN ON THE CONVERSATION AS WELL. AC TO FOLLOW

## 2023-11-19 NOTE — PROGRESS NOTES
"[facility]  _________________________________________________________________    [facility] 24/7 Contact Number: [phone]    - Providers: Please contact [facility] with changes in orders or clinical plan of care   - Nursing: Please contact [facility] with significant changes in patient condition    Hospice will notify the care team (including the hospitalist) to confirm date of inpatient hospice (GIP) admission.    New Epic encounter will not be created until hospice completes admission.   ______________________________________________________________________   AC SPOKE WITH CYNDEE ON IH VISIT, CYNDEE STATED FAMILY WAS UNSURE AS TO IF PT WAS GOING TO MAKE IT THROGH THE NIGHT, PT IS STILL WITH US, CYNDEE STATED THAT  PREFERS NOT TO GO BACK TO THE HOSPITAL STATING THAT HE \"FEELS HE HAS ALREADY LOST HIS WIFE\" NIECE STATED SHE WAS ABLE TO GO TO HOSPITAL AND BE WITH PATIENT. AC CALLED SHERI ARGUELLO AND CONFIRMED SOC FOR 11/20 AT 9 AM WITH ADAM GORE RN   "

## 2023-11-19 NOTE — PROGRESS NOTES
The patient passed away while waiting for inpatient hospice service enrollment while under SICU service.

## 2023-11-19 NOTE — PLAN OF CARE
Patient  at 1440. SICU notified and family called. [place] [facility] [place]

## 2023-11-19 NOTE — PROGRESS NOTES
THORACIC & FOREGUT SURGERY    S: Transitioned to comfort cares, compassionately extubated this AM.    O:  Vitals:    11/18/23 1545 11/18/23 1600 11/18/23 1615 11/18/23 1700   BP: 128/62 (!) 157/69 136/62 128/62   BP Location:  Right arm     Pulse: 84 89 85 83   Resp: 21 22 22 18   Temp:       TempSrc:       SpO2: 99% 99% 99% 100%   Weight:            Most recent labs and Imaging reviewed    A/P: Linda J Mrozinski is a 73 year old female with hx of non small cell lung carcinoma s/p right lower lobe lobectomy on 10/24/23 who was transferred to [facility] from [facility] on 11/18/23 after experiencing an out of hospital cardiac arrest s/p CPR x 15 min on 11/15. Signs and symptoms concerning for anoxic brain injury. Patient is now transitioned to comfort care per family request.     Petra Cristina MD, MD

## 2023-11-19 NOTE — PROGRESS NOTES
STAFF NOTE:  We have transitioned to comfort cares  She is resting peacefully with a RR in the high teens, SpO2 in the 70s, and a readily palpable radial pulse.  No signs of obstruction with her respiratory pattern  I anticipate she may survive for a day or more (the family is aware of this and understand)  Hospitalist team and Inpatient Hospice to take over cares    No billed time.    MANUEL Ambriz MD  Clinical   Anesthesia / Critical Care  *[phone]

## 2023-11-19 NOTE — PROGRESS NOTES
Patient remains comfort cares. Fentanyl drip at 150mcg/hr, prn fentanyl and prn versed utilized. Plan to keep patient comfortable.

## 2023-11-19 NOTE — PHARMACY-ADMISSION MEDICATION HISTORY
Admission Medication History     Patient will be transitioning to hospice, will not complete medication history at this time.

## 2023-11-20 LAB
BACTERIA BRONCH: NO GROWTH
GRAM STAIN RESULT: NORMAL
GRAM STAIN RESULT: NORMAL

## 2023-11-21 LAB
ATRIAL RATE - MUSE: 62 BPM
DIASTOLIC BLOOD PRESSURE - MUSE: NORMAL MMHG
INTERPRETATION ECG - MUSE: NORMAL
P AXIS - MUSE: 78 DEGREES
PR INTERVAL - MUSE: 108 MS
QRS DURATION - MUSE: 118 MS
QT - MUSE: 442 MS
QTC - MUSE: 448 MS
R AXIS - MUSE: 63 DEGREES
SYSTOLIC BLOOD PRESSURE - MUSE: NORMAL MMHG
T AXIS - MUSE: 71 DEGREES
VENTRICULAR RATE- MUSE: 62 BPM

## 2023-11-21 NOTE — DISCHARGE SUMMARY
"NAME: Linda J Mrozinski   MRN: [id number]   : 1950     DATE OF ADMISSION: 2023     DATE OF DISCHARGE/DEATH:  23    PROCEDURES PERFORMED: Bedside Bronchoscopy    PATHOLOGY RESULTS: N/A    CULTURE RESULTS:   23 Blood cultures NGTD  23 Respiratory aerobic cultures NGTD    HOSPITAL COURSE: Linda J Mrozinski is a 73 year old female with hx of non small cell lung carcinoma s/p right lower lobe lobectomy on 10/24/23 who was transferred to [facility] from [facility] on 23 after experiencing an out of hospital cardiac arrest s/p CPR x 15 min on 11/15. On arrival to our hospital, she was admitted to the SICU. 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 Her neurologic status was critical and concerning for diffuse anoxic brain injury. After loving discussion, family elected to transition patient to comfort cares. She was compassionately extubated and passed away on 23.     DISCHARGE EXAM:   Per SICU MD Death Pronouncement:  \"Physical Exam: Unresponsive to noxious stimuli, Spontaneous respirations absent, Breath sounds absent, Carotid pulse absent, Heart sounds absent, Pupillary light reflex absent, and Corneal blink reflex absent     Patient was pronounced dead at 2:40 PM, 2023\"    DISCHARGE INSTRUCTIONS: N/A    DISCHARGE MEDICATIONS:  N/A      "

## 2023-11-23 LAB
BACTERIA BLD CULT: NO GROWTH
BACTERIA BLD CULT: NO GROWTH

## (undated) DEVICE — TUBING SUCTION 10'X3/16" N510

## (undated) DEVICE — LINEN TOWEL PACK X30 5481

## (undated) DEVICE — SUCTION MANIFOLD NEPTUNE 2 SYS 4 PORT 0702-020-000

## (undated) DEVICE — DRSG DRAIN 2X2" 7087

## (undated) DEVICE — DRAPE IOBAN INCISE 23X17" 6650EZ

## (undated) DEVICE — DRSG PRIMAPORE 03 1/8X6" 66000318

## (undated) DEVICE — Device

## (undated) DEVICE — SOL ADH LIQUID BENZOIN SWAB 0.6ML C1544

## (undated) DEVICE — ENDO NDL BX ASPIRATION EBUS 21GA VIZISHOT NA-201SX-4021

## (undated) DEVICE — TAPE MEDIPORE 4"X2YD 2864

## (undated) DEVICE — GLOVE BIOGEL PI MICRO SZ 8.0 48580

## (undated) DEVICE — LINEN GOWN XLG 5407

## (undated) DEVICE — DRSG TEGADERM 2 3/8X2 3/4" 1624W

## (undated) DEVICE — SU VICRYL 2-0 SH 27" UND J417H

## (undated) DEVICE — SU VICRYL 4-0 PS-2 18" UND J496H

## (undated) DEVICE — ENDO APPLICATOR SURGIFLO PLASMA COBLATION MS1995

## (undated) DEVICE — ESU ELEC BLADE 2.75" COATED/INSULATED E1455

## (undated) DEVICE — RX SURGIFLO HEMOSTATIC MATRIX W/THROMBIN 8ML 2994

## (undated) DEVICE — ESU PENCIL SMOKE EVAC W/ROCKER SWITCH 0703-047-000

## (undated) DEVICE — ENDO CLIP CARTIRDGE K2 MED/LG 10 1112

## (undated) DEVICE — SU VICRYL 0 UR-6 27" J603H

## (undated) DEVICE — DRSG STERI STRIP 1/2X4" R1547

## (undated) DEVICE — DRSG TELFA 3X8" 1238

## (undated) DEVICE — LINEN TOWEL PACK X6 WHITE 5487

## (undated) DEVICE — SU SILK 0 TIE 6X30" A306H

## (undated) DEVICE — SPONGE RAY-TEC 4X8" 7318

## (undated) DEVICE — SURGICEL HEMOSTAT 4X8" 1952

## (undated) DEVICE — SU ETHIBOND 5 LRDA 30" B499T

## (undated) DEVICE — ESU ELEC BLADE 6" COATED/INSULATED E1455-6

## (undated) DEVICE — TIES BANDING T50R

## (undated) DEVICE — DRAPE SHEET MED 44X70" 9355

## (undated) DEVICE — SU SILK 2-0 SH 30" K833H

## (undated) DEVICE — LUBRICANT INST KIT ENDO-LUBE 220-90

## (undated) DEVICE — NDL SPINAL 22GA 7" QUINCKE 405149

## (undated) DEVICE — PREP CHLORAPREP 26ML TINTED HI-LITE ORANGE 930815

## (undated) DEVICE — DRSG PRIMAPORE 02X3" 7133

## (undated) DEVICE — SPONGE KITTNER 30-101

## (undated) DEVICE — ENDO VALVE SUCTION BRONCH EVIS MAJ-209

## (undated) DEVICE — CLIP ENDO HEMO-LOC PURPLE LG 544240

## (undated) DEVICE — ENDO SCOPE WARMER SEAL  C3101

## (undated) DEVICE — ENDO VALVE BX EVIS MAJ-210

## (undated) DEVICE — SU VICRYL 3-0 SH 27" UND J416H

## (undated) DEVICE — SUCTION DRY CHEST DRAIN OASIS 3600-100

## (undated) DEVICE — DRAIN CHEST TUBE 24FR STR 8024

## (undated) DEVICE — ESU GROUND PAD ADULT W/CORD E7507

## (undated) DEVICE — SYR 30ML LL W/O NDL 302832

## (undated) DEVICE — SU SILK 0 SH 30" K834H

## (undated) DEVICE — SPONGE TONSIL W/STRING MED 23275-680

## (undated) DEVICE — SU VICRYL 3-0 SH 27" J316H

## (undated) DEVICE — GLOVE BIOGEL PI MICRO INDICATOR UNDERGLOVE SZ 8.5 48985

## (undated) RX ORDER — EPHEDRINE SULFATE 50 MG/ML
INJECTION, SOLUTION INTRAMUSCULAR; INTRAVENOUS; SUBCUTANEOUS
Status: DISPENSED
Start: 2023-01-01

## (undated) RX ORDER — HYDROMORPHONE HCL IN WATER/PF 6 MG/30 ML
PATIENT CONTROLLED ANALGESIA SYRINGE INTRAVENOUS
Status: DISPENSED
Start: 2023-01-01

## (undated) RX ORDER — ACETAMINOPHEN 325 MG/1
TABLET ORAL
Status: DISPENSED
Start: 2023-01-01

## (undated) RX ORDER — CEFAZOLIN SODIUM 1 G/3ML
INJECTION, POWDER, FOR SOLUTION INTRAMUSCULAR; INTRAVENOUS
Status: DISPENSED
Start: 2023-01-01

## (undated) RX ORDER — ENOXAPARIN SODIUM 100 MG/ML
INJECTION SUBCUTANEOUS
Status: DISPENSED
Start: 2023-01-01

## (undated) RX ORDER — CHLORHEXIDINE GLUCONATE ORAL RINSE 1.2 MG/ML
SOLUTION DENTAL
Status: DISPENSED
Start: 2023-01-01

## (undated) RX ORDER — PROPOFOL 10 MG/ML
INJECTION, EMULSION INTRAVENOUS
Status: DISPENSED
Start: 2023-01-01

## (undated) RX ORDER — FENTANYL CITRATE 50 UG/ML
INJECTION, SOLUTION INTRAMUSCULAR; INTRAVENOUS
Status: DISPENSED
Start: 2023-01-01

## (undated) RX ORDER — GABAPENTIN 100 MG/1
CAPSULE ORAL
Status: DISPENSED
Start: 2023-01-01

## (undated) RX ORDER — DEXAMETHASONE SODIUM PHOSPHATE 4 MG/ML
INJECTION, SOLUTION INTRA-ARTICULAR; INTRALESIONAL; INTRAMUSCULAR; INTRAVENOUS; SOFT TISSUE
Status: DISPENSED
Start: 2023-01-01

## (undated) RX ORDER — ONDANSETRON 2 MG/ML
INJECTION INTRAMUSCULAR; INTRAVENOUS
Status: DISPENSED
Start: 2023-01-01

## (undated) RX ORDER — GLYCOPYRROLATE 0.2 MG/ML
INJECTION, SOLUTION INTRAMUSCULAR; INTRAVENOUS
Status: DISPENSED
Start: 2023-01-01

## (undated) RX ORDER — DEXAMETHASONE SODIUM PHOSPHATE 10 MG/ML
INJECTION, SOLUTION INTRAMUSCULAR; INTRAVENOUS
Status: DISPENSED
Start: 2023-01-01

## (undated) RX ORDER — BUPIVACAINE HYDROCHLORIDE 2.5 MG/ML
INJECTION, SOLUTION EPIDURAL; INFILTRATION; INTRACAUDAL
Status: DISPENSED
Start: 2023-01-01

## (undated) RX ORDER — CEFAZOLIN SODIUM/WATER 2 G/20 ML
SYRINGE (ML) INTRAVENOUS
Status: DISPENSED
Start: 2023-01-01

## (undated) RX ORDER — AMOXICILLIN 250 MG
CAPSULE ORAL
Status: DISPENSED
Start: 2023-01-01

## (undated) RX ORDER — DEXTROSE MONOHYDRATE, SODIUM CHLORIDE, AND POTASSIUM CHLORIDE 50; 1.49; 4.5 G/1000ML; G/1000ML; G/1000ML
INJECTION, SOLUTION INTRAVENOUS
Status: DISPENSED
Start: 2023-01-01